# Patient Record
Sex: FEMALE | Race: WHITE | URBAN - METROPOLITAN AREA
[De-identification: names, ages, dates, MRNs, and addresses within clinical notes are randomized per-mention and may not be internally consistent; named-entity substitution may affect disease eponyms.]

---

## 2022-01-20 ENCOUNTER — IMMUNIZATION (OUTPATIENT)
Dept: LAB | Facility: HOSPITAL | Age: 22
End: 2022-01-20
Attending: EMERGENCY MEDICINE
Payer: MEDICAID

## 2022-01-20 DIAGNOSIS — Z23 NEED FOR VACCINATION: Primary | ICD-10-CM

## 2022-01-20 PROCEDURE — 0054A SARSCOV2 VAC 30MCG TRS SUCR: CPT

## 2022-08-16 ENCOUNTER — LAB ENCOUNTER (OUTPATIENT)
Dept: LAB | Age: 22
End: 2022-08-16
Attending: FAMILY MEDICINE
Payer: MEDICAID

## 2022-08-16 DIAGNOSIS — Z00.00 ROUTINE GENERAL MEDICAL EXAMINATION AT A HEALTH CARE FACILITY: Primary | ICD-10-CM

## 2022-08-16 DIAGNOSIS — Z11.1 SCREENING-PULMONARY TB: ICD-10-CM

## 2022-08-16 LAB
ALBUMIN SERPL-MCNC: 3.7 G/DL (ref 3.4–5)
ALBUMIN/GLOB SERPL: 1.1 {RATIO} (ref 1–2)
ALP LIVER SERPL-CCNC: 66 U/L
ALT SERPL-CCNC: 19 U/L
ANION GAP SERPL CALC-SCNC: 3 MMOL/L (ref 0–18)
AST SERPL-CCNC: 13 U/L (ref 15–37)
BASOPHILS # BLD AUTO: 0.02 X10(3) UL (ref 0–0.2)
BASOPHILS NFR BLD AUTO: 0.3 %
BILIRUB SERPL-MCNC: 0.3 MG/DL (ref 0.1–2)
BUN BLD-MCNC: 14 MG/DL (ref 7–18)
CALCIUM BLD-MCNC: 8.6 MG/DL (ref 8.5–10.1)
CHLORIDE SERPL-SCNC: 107 MMOL/L (ref 98–112)
CHOLEST SERPL-MCNC: 149 MG/DL (ref ?–200)
CO2 SERPL-SCNC: 27 MMOL/L (ref 21–32)
CREAT BLD-MCNC: 0.74 MG/DL
EOSINOPHIL # BLD AUTO: 0.14 X10(3) UL (ref 0–0.7)
EOSINOPHIL NFR BLD AUTO: 2.1 %
ERYTHROCYTE [DISTWIDTH] IN BLOOD BY AUTOMATED COUNT: 12.7 %
FASTING PATIENT LIPID ANSWER: YES
FASTING STATUS PATIENT QL REPORTED: YES
GFR SERPLBLD BASED ON 1.73 SQ M-ARVRAT: 118 ML/MIN/1.73M2 (ref 60–?)
GLOBULIN PLAS-MCNC: 3.4 G/DL (ref 2.8–4.4)
GLUCOSE BLD-MCNC: 109 MG/DL (ref 70–99)
HCT VFR BLD AUTO: 38.9 %
HDLC SERPL-MCNC: 54 MG/DL (ref 40–59)
HGB BLD-MCNC: 12.8 G/DL
IMM GRANULOCYTES # BLD AUTO: 0.02 X10(3) UL (ref 0–1)
IMM GRANULOCYTES NFR BLD: 0.3 %
LDLC SERPL CALC-MCNC: 86 MG/DL (ref ?–100)
LYMPHOCYTES # BLD AUTO: 2.4 X10(3) UL (ref 1–4)
LYMPHOCYTES NFR BLD AUTO: 35.5 %
MCH RBC QN AUTO: 29.4 PG (ref 26–34)
MCHC RBC AUTO-ENTMCNC: 32.9 G/DL (ref 31–37)
MCV RBC AUTO: 89.2 FL
MONOCYTES # BLD AUTO: 0.58 X10(3) UL (ref 0.1–1)
MONOCYTES NFR BLD AUTO: 8.6 %
NEUTROPHILS # BLD AUTO: 3.61 X10 (3) UL (ref 1.5–7.7)
NEUTROPHILS # BLD AUTO: 3.61 X10(3) UL (ref 1.5–7.7)
NEUTROPHILS NFR BLD AUTO: 53.2 %
NONHDLC SERPL-MCNC: 95 MG/DL (ref ?–130)
OSMOLALITY SERPL CALC.SUM OF ELEC: 285 MOSM/KG (ref 275–295)
PLATELET # BLD AUTO: 318 10(3)UL (ref 150–450)
POTASSIUM SERPL-SCNC: 4.1 MMOL/L (ref 3.5–5.1)
PROT SERPL-MCNC: 7.1 G/DL (ref 6.4–8.2)
RBC # BLD AUTO: 4.36 X10(6)UL
SODIUM SERPL-SCNC: 137 MMOL/L (ref 136–145)
TRIGL SERPL-MCNC: 37 MG/DL (ref 30–149)
TSI SER-ACNC: 1.91 MIU/ML (ref 0.36–3.74)
VLDLC SERPL CALC-MCNC: 6 MG/DL (ref 0–30)
WBC # BLD AUTO: 6.8 X10(3) UL (ref 4–11)

## 2022-08-16 PROCEDURE — 85025 COMPLETE CBC W/AUTO DIFF WBC: CPT

## 2022-08-16 PROCEDURE — 36415 COLL VENOUS BLD VENIPUNCTURE: CPT

## 2022-08-16 PROCEDURE — 86480 TB TEST CELL IMMUN MEASURE: CPT

## 2022-08-16 PROCEDURE — 80053 COMPREHEN METABOLIC PANEL: CPT

## 2022-08-16 PROCEDURE — 84443 ASSAY THYROID STIM HORMONE: CPT

## 2022-08-16 PROCEDURE — 80061 LIPID PANEL: CPT

## 2022-08-17 LAB
M TB IFN-G CD4+ T-CELLS BLD-ACNC: 0.07 IU/ML
M TB TUBERC IFN-G BLD QL: NEGATIVE
M TB TUBERC IGNF/MITOGEN IGNF CONTROL: >10 IU/ML
QFT TB1 AG MINUS NIL: -0.01 IU/ML
QFT TB2 AG MINUS NIL: -0.03 IU/ML

## 2023-01-24 ENCOUNTER — OFFICE VISIT (OUTPATIENT)
Dept: INTERNAL MEDICINE CLINIC | Facility: CLINIC | Age: 23
End: 2023-01-24

## 2023-01-24 VITALS
WEIGHT: 177 LBS | OXYGEN SATURATION: 98 % | DIASTOLIC BLOOD PRESSURE: 82 MMHG | HEIGHT: 63 IN | SYSTOLIC BLOOD PRESSURE: 118 MMHG | BODY MASS INDEX: 31.36 KG/M2 | HEART RATE: 87 BPM

## 2023-01-24 DIAGNOSIS — N94.6 DYSMENORRHEA: ICD-10-CM

## 2023-01-24 DIAGNOSIS — L70.8 OTHER ACNE: Primary | ICD-10-CM

## 2023-01-24 DIAGNOSIS — N28.1 KIDNEY CYST, ACQUIRED: ICD-10-CM

## 2023-01-24 DIAGNOSIS — R39.198 DIFFICULTY URINATING: ICD-10-CM

## 2023-01-24 PROCEDURE — 3074F SYST BP LT 130 MM HG: CPT | Performed by: INTERNAL MEDICINE

## 2023-01-24 PROCEDURE — 3079F DIAST BP 80-89 MM HG: CPT | Performed by: INTERNAL MEDICINE

## 2023-01-24 PROCEDURE — 99203 OFFICE O/P NEW LOW 30 MIN: CPT | Performed by: INTERNAL MEDICINE

## 2023-01-24 PROCEDURE — 3008F BODY MASS INDEX DOCD: CPT | Performed by: INTERNAL MEDICINE

## 2023-02-02 ENCOUNTER — PATIENT MESSAGE (OUTPATIENT)
Dept: INTERNAL MEDICINE CLINIC | Facility: CLINIC | Age: 23
End: 2023-02-02

## 2023-02-02 NOTE — TELEPHONE ENCOUNTER
From: Dimple Miranda  To: Soraida Hoffman MD  Sent: 2023 5:46 AM CST  Subject: Lab    Hi Dennise,   Wondering if the testosterone blood test that you have ordered for me includes progesterone and estrogen specifically. If not, can you please add those as well?    Thank you,   Sho Hayes

## 2023-02-08 ENCOUNTER — LAB ENCOUNTER (OUTPATIENT)
Dept: LAB | Age: 23
End: 2023-02-08
Attending: INTERNAL MEDICINE
Payer: MEDICAID

## 2023-02-08 ENCOUNTER — HOSPITAL ENCOUNTER (OUTPATIENT)
Dept: ULTRASOUND IMAGING | Age: 23
Discharge: HOME | End: 2023-02-08
Attending: INTERNAL MEDICINE
Payer: MEDICAID

## 2023-02-08 ENCOUNTER — PATIENT MESSAGE (OUTPATIENT)
Dept: INTERNAL MEDICINE CLINIC | Facility: CLINIC | Age: 23
End: 2023-02-08

## 2023-02-08 DIAGNOSIS — N94.6 DYSMENORRHEA: ICD-10-CM

## 2023-02-08 DIAGNOSIS — L70.8 OTHER ACNE: ICD-10-CM

## 2023-02-08 DIAGNOSIS — R39.198 DIFFICULTY URINATING: ICD-10-CM

## 2023-02-08 LAB
ALBUMIN SERPL-MCNC: 4.1 G/DL (ref 3.4–5)
ALBUMIN/GLOB SERPL: 1.1 {RATIO} (ref 1–2)
ALP LIVER SERPL-CCNC: 62 U/L
ALT SERPL-CCNC: 30 U/L
ANION GAP SERPL CALC-SCNC: 4 MMOL/L (ref 0–18)
AST SERPL-CCNC: 14 U/L (ref 15–37)
BASOPHILS # BLD AUTO: 0.01 X10(3) UL (ref 0–0.2)
BASOPHILS NFR BLD AUTO: 0.2 %
BILIRUB SERPL-MCNC: 0.5 MG/DL (ref 0.1–2)
BUN BLD-MCNC: 13 MG/DL (ref 7–18)
BUN/CREAT SERPL: 18.3 (ref 10–20)
CALCIUM BLD-MCNC: 9.1 MG/DL (ref 8.5–10.1)
CHLORIDE SERPL-SCNC: 104 MMOL/L (ref 98–112)
CO2 SERPL-SCNC: 30 MMOL/L (ref 21–32)
CREAT BLD-MCNC: 0.71 MG/DL
DEPRECATED RDW RBC AUTO: 40.8 FL (ref 35.1–46.3)
DHEA-S SERPL-MCNC: 421.1 UG/DL
EOSINOPHIL # BLD AUTO: 0.1 X10(3) UL (ref 0–0.7)
EOSINOPHIL NFR BLD AUTO: 1.5 %
ERYTHROCYTE [DISTWIDTH] IN BLOOD BY AUTOMATED COUNT: 12.4 % (ref 11–15)
ESTRADIOL SERPL-MCNC: 75.1 PG/ML
FASTING STATUS PATIENT QL REPORTED: YES
GFR SERPLBLD BASED ON 1.73 SQ M-ARVRAT: 123 ML/MIN/1.73M2 (ref 60–?)
GLOBULIN PLAS-MCNC: 3.6 G/DL (ref 2.8–4.4)
GLUCOSE BLD-MCNC: 101 MG/DL (ref 70–99)
HCT VFR BLD AUTO: 39.2 %
HGB BLD-MCNC: 13 G/DL
IMM GRANULOCYTES # BLD AUTO: 0.01 X10(3) UL (ref 0–1)
IMM GRANULOCYTES NFR BLD: 0.2 %
LYMPHOCYTES # BLD AUTO: 2.06 X10(3) UL (ref 1–4)
LYMPHOCYTES NFR BLD AUTO: 31.4 %
MCH RBC QN AUTO: 29.6 PG (ref 26–34)
MCHC RBC AUTO-ENTMCNC: 33.2 G/DL (ref 31–37)
MCV RBC AUTO: 89.3 FL
MONOCYTES # BLD AUTO: 0.6 X10(3) UL (ref 0.1–1)
MONOCYTES NFR BLD AUTO: 9.1 %
NEUTROPHILS # BLD AUTO: 3.78 X10 (3) UL (ref 1.5–7.7)
NEUTROPHILS # BLD AUTO: 3.78 X10(3) UL (ref 1.5–7.7)
NEUTROPHILS NFR BLD AUTO: 57.6 %
OSMOLALITY SERPL CALC.SUM OF ELEC: 286 MOSM/KG (ref 275–295)
PLATELET # BLD AUTO: 364 10(3)UL (ref 150–450)
POTASSIUM SERPL-SCNC: 3.8 MMOL/L (ref 3.5–5.1)
PROGEST SERPL-MCNC: 2.75 NG/ML
PROT SERPL-MCNC: 7.7 G/DL (ref 6.4–8.2)
RBC # BLD AUTO: 4.39 X10(6)UL
SODIUM SERPL-SCNC: 138 MMOL/L (ref 136–145)
WBC # BLD AUTO: 6.6 X10(3) UL (ref 4–11)

## 2023-02-08 PROCEDURE — 84144 ASSAY OF PROGESTERONE: CPT

## 2023-02-08 PROCEDURE — 36415 COLL VENOUS BLD VENIPUNCTURE: CPT

## 2023-02-08 PROCEDURE — 82627 DEHYDROEPIANDROSTERONE: CPT

## 2023-02-08 PROCEDURE — 85025 COMPLETE CBC W/AUTO DIFF WBC: CPT

## 2023-02-08 PROCEDURE — 84403 ASSAY OF TOTAL TESTOSTERONE: CPT

## 2023-02-08 PROCEDURE — 84402 ASSAY OF FREE TESTOSTERONE: CPT

## 2023-02-08 PROCEDURE — 76770 US EXAM ABDO BACK WALL COMP: CPT | Performed by: INTERNAL MEDICINE

## 2023-02-08 PROCEDURE — 80053 COMPREHEN METABOLIC PANEL: CPT

## 2023-02-08 PROCEDURE — 82670 ASSAY OF TOTAL ESTRADIOL: CPT

## 2023-02-09 NOTE — TELEPHONE ENCOUNTER
Dr. Nathalia Monroe, please review and advise on [employer] message. Thanks. No future appointments. US Kidney/bladder:    CONCLUSION:   1. Right kidney smaller than left, postop change? 2. Mild hydronephrosis right kidney. 3. Fullness to left collecting structures. 4. 204.66 ml postvoid urinary bladder volume. 11. Suggest obtaining old images/records for further evaluation.

## 2023-02-14 NOTE — TELEPHONE ENCOUNTER
Future Appointments   Date Time Provider Kj Hannon   3/31/2023  4:00 PM Vanda Carrillo MD [address]

## 2023-02-17 LAB
TESTOSTERONE, FREE, S: 0.54 NG/DL
TESTOSTERONE, TOTAL, S: 25 NG/DL

## 2023-03-20 ENCOUNTER — OFFICE VISIT (OUTPATIENT)
Dept: INTERNAL MEDICINE CLINIC | Facility: CLINIC | Age: 23
End: 2023-03-20

## 2023-03-20 VITALS
DIASTOLIC BLOOD PRESSURE: 81 MMHG | OXYGEN SATURATION: 97 % | HEIGHT: 63 IN | SYSTOLIC BLOOD PRESSURE: 123 MMHG | HEART RATE: 95 BPM | BODY MASS INDEX: 32.07 KG/M2 | WEIGHT: 181 LBS

## 2023-03-20 DIAGNOSIS — Z00.00 PHYSICAL EXAM, ANNUAL: Primary | ICD-10-CM

## 2023-03-20 DIAGNOSIS — Z30.011 BCP (BIRTH CONTROL PILLS) INITIATION: ICD-10-CM

## 2023-03-20 DIAGNOSIS — Z13.21 ENCOUNTER FOR VITAMIN DEFICIENCY SCREENING: ICD-10-CM

## 2023-03-20 PROCEDURE — 3079F DIAST BP 80-89 MM HG: CPT | Performed by: INTERNAL MEDICINE

## 2023-03-20 PROCEDURE — 99395 PREV VISIT EST AGE 18-39: CPT | Performed by: INTERNAL MEDICINE

## 2023-03-20 PROCEDURE — 3074F SYST BP LT 130 MM HG: CPT | Performed by: INTERNAL MEDICINE

## 2023-03-20 PROCEDURE — 3008F BODY MASS INDEX DOCD: CPT | Performed by: INTERNAL MEDICINE

## 2023-03-20 RX ORDER — NORGESTIMATE AND ETHINYL ESTRADIOL 7DAYSX3 28
1 KIT ORAL DAILY
Qty: 84 TABLET | Refills: 0 | Status: SHIPPED | OUTPATIENT
Start: 2023-03-20

## 2023-04-21 ENCOUNTER — LAB ENCOUNTER (OUTPATIENT)
Dept: LAB | Age: 23
End: 2023-04-21
Attending: INTERNAL MEDICINE
Payer: MEDICAID

## 2023-04-21 DIAGNOSIS — R73.9 HYPERGLYCEMIA: ICD-10-CM

## 2023-04-21 DIAGNOSIS — Z13.21 ENCOUNTER FOR VITAMIN DEFICIENCY SCREENING: ICD-10-CM

## 2023-04-21 DIAGNOSIS — Z00.00 PHYSICAL EXAM, ANNUAL: ICD-10-CM

## 2023-04-21 LAB
ALBUMIN SERPL-MCNC: 3.9 G/DL (ref 3.4–5)
ALBUMIN/GLOB SERPL: 1 {RATIO} (ref 1–2)
ALP LIVER SERPL-CCNC: 59 U/L
ALT SERPL-CCNC: 49 U/L
ANION GAP SERPL CALC-SCNC: 7 MMOL/L (ref 0–18)
AST SERPL-CCNC: 24 U/L (ref 15–37)
BASOPHILS # BLD AUTO: 0.02 X10(3) UL (ref 0–0.2)
BASOPHILS NFR BLD AUTO: 0.3 %
BILIRUB SERPL-MCNC: 0.4 MG/DL (ref 0.1–2)
BILIRUB UR QL: NEGATIVE
BUN BLD-MCNC: 12 MG/DL (ref 7–18)
BUN/CREAT SERPL: 16.4 (ref 10–20)
CALCIUM BLD-MCNC: 9.2 MG/DL (ref 8.5–10.1)
CHLORIDE SERPL-SCNC: 104 MMOL/L (ref 98–112)
CHOLEST SERPL-MCNC: 147 MG/DL (ref ?–200)
CLARITY UR: CLEAR
CO2 SERPL-SCNC: 28 MMOL/L (ref 21–32)
COLOR UR: COLORLESS
CREAT BLD-MCNC: 0.73 MG/DL
DEPRECATED RDW RBC AUTO: 41.8 FL (ref 35.1–46.3)
EOSINOPHIL # BLD AUTO: 0.12 X10(3) UL (ref 0–0.7)
EOSINOPHIL NFR BLD AUTO: 1.7 %
ERYTHROCYTE [DISTWIDTH] IN BLOOD BY AUTOMATED COUNT: 12.8 % (ref 11–15)
EST. AVERAGE GLUCOSE BLD GHB EST-MCNC: 108 MG/DL (ref 68–126)
FASTING PATIENT LIPID ANSWER: YES
FASTING STATUS PATIENT QL REPORTED: YES
GFR SERPLBLD BASED ON 1.73 SQ M-ARVRAT: 119 ML/MIN/1.73M2 (ref 60–?)
GLOBULIN PLAS-MCNC: 3.8 G/DL (ref 2.8–4.4)
GLUCOSE BLD-MCNC: 107 MG/DL (ref 70–99)
GLUCOSE UR-MCNC: NORMAL MG/DL
HBA1C MFR BLD: 5.4 % (ref ?–5.7)
HCT VFR BLD AUTO: 38.8 %
HDLC SERPL-MCNC: 70 MG/DL (ref 40–59)
HGB BLD-MCNC: 12.7 G/DL
HGB UR QL STRIP.AUTO: NEGATIVE
IMM GRANULOCYTES # BLD AUTO: 0.01 X10(3) UL (ref 0–1)
IMM GRANULOCYTES NFR BLD: 0.1 %
KETONES UR-MCNC: NEGATIVE MG/DL
LDLC SERPL CALC-MCNC: 63 MG/DL (ref ?–100)
LEUKOCYTE ESTERASE UR QL STRIP.AUTO: 500
LYMPHOCYTES # BLD AUTO: 1.99 X10(3) UL (ref 1–4)
LYMPHOCYTES NFR BLD AUTO: 28.1 %
MCH RBC QN AUTO: 29.2 PG (ref 26–34)
MCHC RBC AUTO-ENTMCNC: 32.7 G/DL (ref 31–37)
MCV RBC AUTO: 89.2 FL
MONOCYTES # BLD AUTO: 0.49 X10(3) UL (ref 0.1–1)
MONOCYTES NFR BLD AUTO: 6.9 %
NEUTROPHILS # BLD AUTO: 4.46 X10 (3) UL (ref 1.5–7.7)
NEUTROPHILS # BLD AUTO: 4.46 X10(3) UL (ref 1.5–7.7)
NEUTROPHILS NFR BLD AUTO: 62.9 %
NITRITE UR QL STRIP.AUTO: NEGATIVE
NONHDLC SERPL-MCNC: 77 MG/DL (ref ?–130)
OSMOLALITY SERPL CALC.SUM OF ELEC: 288 MOSM/KG (ref 275–295)
PH UR: 7 [PH] (ref 5–8)
PLATELET # BLD AUTO: 347 10(3)UL (ref 150–450)
POTASSIUM SERPL-SCNC: 4.3 MMOL/L (ref 3.5–5.1)
PROT SERPL-MCNC: 7.7 G/DL (ref 6.4–8.2)
PROT UR-MCNC: NEGATIVE MG/DL
RBC # BLD AUTO: 4.35 X10(6)UL
SODIUM SERPL-SCNC: 139 MMOL/L (ref 136–145)
SP GR UR STRIP: <1.005 (ref 1–1.03)
TRIGL SERPL-MCNC: 72 MG/DL (ref 30–149)
TSI SER-ACNC: 1.12 MIU/ML (ref 0.36–3.74)
UROBILINOGEN UR STRIP-ACNC: NORMAL
VIT D+METAB SERPL-MCNC: 28.8 NG/ML (ref 30–100)
VLDLC SERPL CALC-MCNC: 11 MG/DL (ref 0–30)
WBC # BLD AUTO: 7.1 X10(3) UL (ref 4–11)

## 2023-04-21 PROCEDURE — 80053 COMPREHEN METABOLIC PANEL: CPT

## 2023-04-21 PROCEDURE — 85025 COMPLETE CBC W/AUTO DIFF WBC: CPT

## 2023-04-21 PROCEDURE — 81001 URINALYSIS AUTO W/SCOPE: CPT

## 2023-04-21 PROCEDURE — 82306 VITAMIN D 25 HYDROXY: CPT

## 2023-04-21 PROCEDURE — 84443 ASSAY THYROID STIM HORMONE: CPT

## 2023-04-21 PROCEDURE — 36415 COLL VENOUS BLD VENIPUNCTURE: CPT

## 2023-04-21 PROCEDURE — 83036 HEMOGLOBIN GLYCOSYLATED A1C: CPT

## 2023-04-21 PROCEDURE — 80061 LIPID PANEL: CPT

## 2023-04-28 ENCOUNTER — LAB ENCOUNTER (OUTPATIENT)
Dept: LAB | Age: 23
End: 2023-04-28
Attending: INTERNAL MEDICINE
Payer: MEDICAID

## 2023-04-28 DIAGNOSIS — R82.90 ABNORMAL RESULT ON SCREENING URINE TEST: ICD-10-CM

## 2023-04-28 LAB
BILIRUB UR QL: NEGATIVE
COLOR UR: YELLOW
GLUCOSE UR-MCNC: NORMAL MG/DL
KETONES UR-MCNC: NEGATIVE MG/DL
LEUKOCYTE ESTERASE UR QL STRIP.AUTO: 500
PH UR: 6 [PH] (ref 5–8)
PROT UR-MCNC: 20 MG/DL
SP GR UR STRIP: 1.03 (ref 1–1.03)
UROBILINOGEN UR STRIP-ACNC: NORMAL
WBC #/AREA URNS AUTO: >50 /HPF

## 2023-04-28 PROCEDURE — 87186 SC STD MICRODIL/AGAR DIL: CPT

## 2023-04-28 PROCEDURE — 87086 URINE CULTURE/COLONY COUNT: CPT

## 2023-04-28 PROCEDURE — 81001 URINALYSIS AUTO W/SCOPE: CPT

## 2023-04-28 PROCEDURE — 87088 URINE BACTERIA CULTURE: CPT

## 2023-05-01 ENCOUNTER — TELEPHONE (OUTPATIENT)
Dept: INTERNAL MEDICINE CLINIC | Facility: CLINIC | Age: 23
End: 2023-05-01

## 2023-05-01 RX ORDER — NITROFURANTOIN 25; 75 MG/1; MG/1
100 CAPSULE ORAL 2 TIMES DAILY
Qty: 14 CAPSULE | Refills: 0 | Status: SHIPPED | OUTPATIENT
Start: 2023-05-01

## 2023-05-01 RX ORDER — NITROFURANTOIN 25; 75 MG/1; MG/1
100 CAPSULE ORAL 2 TIMES DAILY
Qty: 14 CAPSULE | Refills: 0 | Status: CANCELLED | OUTPATIENT
Start: 2023-05-01

## 2023-05-01 NOTE — TELEPHONE ENCOUNTER
From: Lily Miranda  Sent: 5/1/2023 9:36 AM CDT  To: Em Triage Support  Subject: Medication hi hi Yesenia Restrepo    Is it possible to make the change, or [facility] would be the best?

## 2023-05-01 NOTE — TELEPHONE ENCOUNTER
Please resend to [facility]?

## 2023-05-06 ENCOUNTER — TELEPHONE (OUTPATIENT)
Dept: INTERNAL MEDICINE CLINIC | Facility: CLINIC | Age: 23
End: 2023-05-06

## 2023-05-08 NOTE — TELEPHONE ENCOUNTER
See 5/6/23 Patient message Antibiotics. No further action needed. From: Lisandra Miranda  Sent: 5/6/2023  1:26 PM CDT  To: Em Triage Support  Subject: medication refill    Hi,   I am having bad service today, since I am out of town. But is there any way to get to your office this Monday? I have an appointment scheduled for next Monday, but is there a way to come in sooner?   Alesia Lafleur

## 2023-05-15 ENCOUNTER — OFFICE VISIT (OUTPATIENT)
Dept: INTERNAL MEDICINE CLINIC | Facility: CLINIC | Age: 23
End: 2023-05-15

## 2023-05-15 ENCOUNTER — TELEPHONE (OUTPATIENT)
Dept: INTERNAL MEDICINE CLINIC | Facility: CLINIC | Age: 23
End: 2023-05-15

## 2023-05-15 VITALS
WEIGHT: 177.81 LBS | HEART RATE: 75 BPM | BODY MASS INDEX: 31.5 KG/M2 | HEIGHT: 63 IN | DIASTOLIC BLOOD PRESSURE: 80 MMHG | SYSTOLIC BLOOD PRESSURE: 119 MMHG

## 2023-05-15 DIAGNOSIS — R31.29 MICROSCOPIC HEMATURIA: ICD-10-CM

## 2023-05-15 DIAGNOSIS — Z98.890 HISTORY OF UROLOGIC SURGERY: ICD-10-CM

## 2023-05-15 DIAGNOSIS — N39.0 URINARY TRACT INFECTION WITHOUT HEMATURIA, SITE UNSPECIFIED: Primary | ICD-10-CM

## 2023-05-15 DIAGNOSIS — N13.39 OTHER HYDRONEPHROSIS: ICD-10-CM

## 2023-05-15 PROBLEM — Q64.9: Status: ACTIVE | Noted: 2023-05-15

## 2023-05-15 PROBLEM — N13.1 HYDRONEPHROSIS WITH URETERAL STRICTURE, NOT ELSEWHERE CLASSIFIED: Status: ACTIVE | Noted: 2023-05-15

## 2023-05-15 PROCEDURE — 99214 OFFICE O/P EST MOD 30 MIN: CPT | Performed by: INTERNAL MEDICINE

## 2023-05-15 PROCEDURE — 3074F SYST BP LT 130 MM HG: CPT | Performed by: INTERNAL MEDICINE

## 2023-05-15 PROCEDURE — 3008F BODY MASS INDEX DOCD: CPT | Performed by: INTERNAL MEDICINE

## 2023-05-15 PROCEDURE — 3079F DIAST BP 80-89 MM HG: CPT | Performed by: INTERNAL MEDICINE

## 2023-05-15 NOTE — TELEPHONE ENCOUNTER
Can you see this pt  Sooner than 6-7-23. reccurent  RIGHT  Flank Pain  And symptoms of  UTI, history of surgery  Is infant in [place].

## 2023-05-15 NOTE — TELEPHONE ENCOUNTER
I will have my staff try to get her in sooner. Instead of getting a repeat ultrasound, can you please get a CT urogram?  For insurance purposes, diagnosis can be microscopic hematuria and recurrent UTIs. Uro staff: Please see if we can accommodate this patient for consultation earlier than 6/7/2023. Preferably after updated imaging (CT urogram) which Dr. Kaushik Oviedo would order.        Nataliya Vasques MD  5/15/2023

## 2023-05-16 NOTE — TELEPHONE ENCOUNTER
Patient has scheduled CT urogram scheduled for 5/25/23. Please advise if I can schedule patient in a procedure spot.      Future Appointments   Date Time Provider Kj Hannon   5/25/2023  7:00 PM [address] [facility] CT MAIN RM4 [address] [place] [address]   6/7/2023  4:00 PM [facility]   6/16/2023 10:00 AM Alina Masterson MD [facility] [place]

## 2023-05-17 ENCOUNTER — LAB ENCOUNTER (OUTPATIENT)
Dept: LAB | Age: 23
End: 2023-05-17
Attending: INTERNAL MEDICINE
Payer: MEDICAID

## 2023-05-17 DIAGNOSIS — N39.0 URINARY TRACT INFECTION WITHOUT HEMATURIA, SITE UNSPECIFIED: ICD-10-CM

## 2023-05-17 LAB
BILIRUB UR QL: NEGATIVE
CLARITY UR: CLEAR
COLOR UR: COLORLESS
GLUCOSE UR-MCNC: NORMAL MG/DL
HGB UR QL STRIP.AUTO: NEGATIVE
KETONES UR-MCNC: NEGATIVE MG/DL
LEUKOCYTE ESTERASE UR QL STRIP.AUTO: 25
NITRITE UR QL STRIP.AUTO: NEGATIVE
PH UR: 6.5 [PH] (ref 5–8)
PROT UR-MCNC: NEGATIVE MG/DL
SP GR UR STRIP: 1.01 (ref 1–1.03)
UROBILINOGEN UR STRIP-ACNC: NORMAL

## 2023-05-17 PROCEDURE — 87086 URINE CULTURE/COLONY COUNT: CPT

## 2023-05-17 PROCEDURE — 81001 URINALYSIS AUTO W/SCOPE: CPT

## 2023-05-17 NOTE — TELEPHONE ENCOUNTER
Spoke with patient, assisted in scheduling consult. PT confirmed and verbalized understanding. Javier Alcala MD  You; Em Urology Clinical Staff 2 hours ago (9:17 AM)     Can double book 2 PM 5/31 or use procedure slot 6/1.       Future Appointments   Date Time Provider Kj Riddhi   5/25/2023  7:00 PM [address] [facility] [address] [place] [address]   6/1/2023  1:00 PM Javier Alcala MD [facility]   6/7/2023  4:00 PM Javier Alcala MD [facility]   6/16/2023 10:00 AM Osorio Luevano MD [facility] [place]

## 2023-05-25 ENCOUNTER — HOSPITAL ENCOUNTER (OUTPATIENT)
Dept: CT IMAGING | Facility: HOSPITAL | Age: 23
Discharge: HOME | End: 2023-05-25
Attending: INTERNAL MEDICINE
Payer: MEDICAID

## 2023-05-25 DIAGNOSIS — Z98.890 HISTORY OF UROLOGIC SURGERY: ICD-10-CM

## 2023-05-25 DIAGNOSIS — R31.29 MICROSCOPIC HEMATURIA: ICD-10-CM

## 2023-05-25 DIAGNOSIS — N13.39 OTHER HYDRONEPHROSIS: ICD-10-CM

## 2023-05-25 LAB
CREAT BLD-MCNC: 0.7 MG/DL
GFR SERPLBLD BASED ON 1.73 SQ M-ARVRAT: 125 ML/MIN/1.73M2 (ref 60–?)

## 2023-05-25 PROCEDURE — 82565 ASSAY OF CREATININE: CPT

## 2023-05-25 PROCEDURE — 74178 CT ABD&PLV WO CNTR FLWD CNTR: CPT | Performed by: INTERNAL MEDICINE

## 2023-05-25 PROCEDURE — 76377 3D RENDER W/INTRP POSTPROCES: CPT | Performed by: INTERNAL MEDICINE

## 2023-05-26 ENCOUNTER — TELEPHONE (OUTPATIENT)
Dept: INTERNAL MEDICINE CLINIC | Facility: CLINIC | Age: 23
End: 2023-05-26

## 2023-05-26 NOTE — TELEPHONE ENCOUNTER
Message # [phone]         2023 08:09p   [CRISTIAN]  To:  From:  RL Saini MD:  Phone#:  ----------------------------------------------------------------------  FANTASMA Kam [phone] RE ROSI WEISS,  2000  STAT RESULTS  Paged at number :  PAGE: [phone] at : [facility]-  20:09

## 2023-05-26 NOTE — TELEPHONE ENCOUNTER
Received page 5/25, unable to reach patient,   Spoke with patient this morning 5/26; informed of results, needs pelvic MRI w/wo contrast. She is also scheduled for further evaluation with Urology 6/01/2023. Strongly advised to keep appt. F/u with PCP if any questions.

## 2023-05-30 ENCOUNTER — HOSPITAL ENCOUNTER (OUTPATIENT)
Dept: MRI IMAGING | Facility: HOSPITAL | Age: 23
Discharge: HOME | End: 2023-05-30
Attending: NURSE PRACTITIONER
Payer: MEDICAID

## 2023-05-30 DIAGNOSIS — R93.5 ABNORMAL CT OF THE ABDOMEN: ICD-10-CM

## 2023-05-30 PROCEDURE — 72197 MRI PELVIS W/O & W/DYE: CPT | Performed by: NURSE PRACTITIONER

## 2023-05-30 PROCEDURE — A9575 INJ GADOTERATE MEGLUMI 0.1ML: HCPCS | Performed by: NURSE PRACTITIONER

## 2023-05-30 RX ORDER — GADOTERATE MEGLUMINE 376.9 MG/ML
20 INJECTION INTRAVENOUS
Status: COMPLETED | OUTPATIENT
Start: 2023-05-30 | End: 2023-05-30

## 2023-05-30 RX ADMIN — GADOTERATE MEGLUMINE 15 ML: 376.9 INJECTION INTRAVENOUS at 11:36:00

## 2023-06-01 ENCOUNTER — OFFICE VISIT (OUTPATIENT)
Dept: SURGERY | Facility: CLINIC | Age: 23
End: 2023-06-01

## 2023-06-01 VITALS — HEART RATE: 92 BPM | SYSTOLIC BLOOD PRESSURE: 127 MMHG | DIASTOLIC BLOOD PRESSURE: 88 MMHG

## 2023-06-01 DIAGNOSIS — Q62.5 DUPLICATED URINARY COLLECTING SYSTEM: ICD-10-CM

## 2023-06-01 DIAGNOSIS — Z98.890 S/P UROLOGICAL SURGERY: ICD-10-CM

## 2023-06-01 DIAGNOSIS — R82.71 ASYMPTOMATIC BACTERIURIA: Primary | ICD-10-CM

## 2023-06-01 PROCEDURE — 99244 OFF/OP CNSLTJ NEW/EST MOD 40: CPT | Performed by: UROLOGY

## 2023-06-01 PROCEDURE — 3074F SYST BP LT 130 MM HG: CPT | Performed by: UROLOGY

## 2023-06-01 PROCEDURE — 3079F DIAST BP 80-89 MM HG: CPT | Performed by: UROLOGY

## 2023-06-01 NOTE — PROGRESS NOTES
[facility] Urology  Initial Office Consultation    HPI:   Juan Iglesias is a 25year old female here today for consultation at the request of, and a copy of this note will be sent to, Sarthak Marvin MD.  Accompanied by her mother. 1. Asymptomatic Bacteriuria  2. Bilateral Duplicated collecting system  3. History of pediatric urological surgery  History obtained from patient and accompanying mother's recollection. Alesia Lafleur was born in [facility] and moved here around age 15. 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 Looks like she had recurrent UTIs and pyelonephritis as a child. Sounds like she underwent a right upper pole moiety partial nephrectomy with partial ureterectomy at age 3 as evidenced by a right flank scar. At almost 3years old she had bladder surgery to questionably perform a distal right ureterectomy versus reimplant for management of a ureterocele. She denies any flank pain at this point. No gross hematuria or dysuria. No difficulty urinating or incomplete bladder emptying. No straining to urinate. Her primary care physician sent her urine for routine analysis on 4/21/2023. This revealed pyuria, and bacteriuria. Repeat urinalysis 4/28/2023 with leuks, nitrites, pyuria, microhematuria. Urine culture was positive for E. Coli. Patient was completely asymptomatic. Treated with a course of sensitive antibiotics. Repeat urine culture was negative. She had a renal ultrasound February 2023 which revealed right kidney smaller than the left. Mild right hydronephrosis. Fullness of the left collecting structures. 200 for a mild postvoid bladder volume. CT urogram completed 5/2023 revealing irregular soft tissue at the base of the urinary bladder.   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 Partial duplication of the left collecting system with hydroureter of the mid to distal left ureter. No evidence of obstruction. No urinary calculi or obstructive uropathy. This was followed by an MRI of the pelvis with and without contrast 5/30/2023 which revealed a fluid-filled tubular structure of the right hemipelvis favored to represent a duplicated ureteral segment, supported by presence of a small bladder ureterocele. The superior margin of this process appears blind-ending which may be on a chronic postsurgical basis. No focal wall thickening or mass of the bladder. No adenopathy. Patient has normal kidney function at baseline. Bladder scan today reveals a PVR of 3 mL. 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 PAST SURGICAL HISTORY: ? Right upper pole moiety heminephrectomy with partial ureterectomy. ? Distal ureterectomy. SOCIAL HISTORY: Single. No children. No smoking or illicit drug use. In school to become a radiology technician. History reviewed. No pertinent family history. Allergies: Patient has no known allergies. REVIEW OF SYSTEMS:  Pertinent positives and negatives per HPI. A 12-point ROS was performed and is otherwise negative. EXAM:  /88 (BP Location: Right arm, Patient Position: Sitting, Cuff Size: adult)   Pulse 92   LMP 05/03/2023 (Exact Date)     Physical Exam  Vitals reviewed. Constitutional:       General: She is not in acute distress. Appearance: She is well-developed. HENT:      Head: Atraumatic. Eyes:      General: No scleral icterus. Cardiovascular:      Rate and Rhythm: Normal rate. Pulmonary:      Effort: Pulmonary effort is normal.   Abdominal:      Palpations: Abdomen is soft. There is no mass. Tenderness:  There is no right CVA tenderness or left CVA tenderness. Comments: Right flank scar. Suprapubic pfannenstiel scar. Musculoskeletal:         General: Normal range of motion. Cervical back: Normal range of motion. Skin:     General: Skin is warm and dry. Neurological:      Mental Status: She is alert and oriented to person, place, and time. Psychiatric:         Behavior: Behavior normal.       LABS:  See HPI for details. IMAGING:    MRI PELVIS W+WO (5/30/2023): 1. Fluid-filled tubular structure of the right hemipelvis favored to represent a duplicated ureteral segment, supported by presence of a small bladder ureterocele. The superior margin of this process appears blind-ending which may be on a chronic postsurgical basis. 2. Thickening of the junctional zone within the uterine fundus may be a manifestation of adenomyosis, though no associated subendometrial cystic change. Clinical correlation advised. 3. Normal ovaries. CT UROGRAM (5/25/2023):  1. There is irregular soft tissue at the base the urinary bladder measuring up to 2.7 x 1.1 cm that could represent a primary urothelial neoplasm in the appropriate clinical setting. Clinical correlation recommended along with cystoscopic evaluation as clinically directed. 2. There is a nonspecific dilated tubular structure in the right pelvis along the right adnexa measuring up to 11 mm in diameter that does not fill with contrast on the delayed phase imaging that could represent an anomalous partially duplicated right ureter as is suggested by the patient's previous urologic surgical history as documented in the patient's chart, less likely an area of hydrosalpinx, an atypical ureterocele, with other etiologies not entirely excluded. Clinical correlation recommended. Pelvic MRI with and without contrast may be helpful for further characterization.   3. There is partial duplication of the left collecting system with hydroureter of the mid to distal left ureter measuring up to 10 mm in diameter which may be congenital given the patient's history. Clinical correlation recommended. There is no evidence of obstruction. 4. No urinary calculi or obstructive uropathy. Please see above for further details. The radiology support staff is in the process of contacting the referring physician regarding this report. IMPRESSION:  25year old female with a complicated past pediatric urologic history most likely consistent with bilateral duplicated collecting systems and a nonfunctioning obstructed right upper pole moiety which was managed surgically in [facility] as a child with right upper pole heminephrectomy. She also seems to have underwent surgery for management of a right-sided ureterocele. Patient with what looks like a defunctionalized segment of the right ureter distally. She has no clear evidence of urinary obstruction. Pelvic MRI without concerns for bladder masses. She currently has normal kidney function on blood work. She has had asymptomatic bacteriuria but denied any active symptoms to suggest a UTI. Findings reviewed with patient and accompanying mother at length. Extensive chart review performed. I do not feel that there is any indication for further urologic intervention at this time. Her congenital urological anomalies seem to have been managed surgically as a child. Current imaging findings in my opinion do not warrant further intervention. Regarding her asymptomatic bacteriuria: I discussed that there is no indication for treatment unless she becomes pregnant at which point asymptomatic bacteriuria should be treated. Potentially she can be placed on suppressive antibiotics if this was persistent at the time. I think we may follow her up with annual renal ultrasounds to ensure absence of hydronephrosis or renal obstruction. We may consider cystoscopy with bilateral retrograde pyelography in the future for further anatomic evaluation. We may also consider nuclear medicine renal scan and VCUG for further evaluation as clinically indicated. Patient and mother verbalized understanding. They agree with the plan of care. All questions answered. PLAN:  1. No urgent urologic intervention at this point. Clinically monitor. 2.  Asymptomatic bacteriuria does not need to be treated unless patient becomes pregnant. This will be screened for accordingly. 3.  Follow-up with annual renal ultrasounds for the time being. 4.  Further anatomic evaluation as indicated, to include cystoscopy with bilateral retrograde pyelograms, voiding cystourethrogram, and nuclear medicine renal scan.       Byron Caballero MD  6/1/2023

## 2023-06-14 RX ORDER — NORGESTIMATE AND ETHINYL ESTRADIOL 7DAYSX3 28
1 KIT ORAL DAILY
Qty: 84 TABLET | Refills: 0 | Status: SHIPPED | OUTPATIENT
Start: 2023-06-14

## 2023-06-14 NOTE — TELEPHONE ENCOUNTER
No pap result seen on file. Thanks     Please review refill protocol failed/ no protocol  Requested Prescriptions   Pending Prescriptions Disp Refills    Norgestim-Eth Estrad Triphasic (ORTHO TRI-CYCLEN, 28,) 0.18/0.215/0.25 MG-35 MCG Oral Tab 84 tablet 0     Sig: Take 1 tablet by mouth daily.        Gynecology Medication Protocol Failed - 6/14/2023 10:02 AM        Failed - Pass dependent on manual look-up of last PAP and patient compliance with PAP follow up recommendations        Passed - In person appointment or virtual visit in the past 12 mos or appointment in next 3 mos     Recent Outpatient Visits              1 week ago Asymptomatic bacteriuria    Yony Phillips MD    Office Visit    1 month ago [facility]inary tract infection without hematuria, site [facility]    [facility]    2 months ago Physical exam, annual    Darla Miss, [address], [place] Brady Ott MD    Office Visit    4 months ago Other acne    Vicki Marinelli, Olaf Castellanos MD    Office Visit          Future Appointments         Provider Department Appt Notes    In 2 days Brady Ott MD Darla Miss, [address], [place] Follow up on medication    In 1 week [address] [facility]  Sw up on the right kidney
5

## 2023-06-16 ENCOUNTER — OFFICE VISIT (OUTPATIENT)
Dept: INTERNAL MEDICINE CLINIC | Facility: CLINIC | Age: 23
End: 2023-06-16

## 2023-06-16 VITALS
WEIGHT: 171.31 LBS | DIASTOLIC BLOOD PRESSURE: 75 MMHG | HEIGHT: 63 IN | SYSTOLIC BLOOD PRESSURE: 111 MMHG | BODY MASS INDEX: 30.35 KG/M2 | RESPIRATION RATE: 16 BRPM | HEART RATE: 69 BPM

## 2023-06-16 DIAGNOSIS — Q64.9 CONGENITAL ANOMALY OF URINARY TRACT: ICD-10-CM

## 2023-06-16 DIAGNOSIS — N28.1 KIDNEY CYST, ACQUIRED: ICD-10-CM

## 2023-06-16 DIAGNOSIS — F41.8 ANXIETY ABOUT HEALTH: ICD-10-CM

## 2023-06-16 DIAGNOSIS — Z30.41 SURVEILLANCE FOR BIRTH CONTROL, ORAL CONTRACEPTIVES: Primary | ICD-10-CM

## 2023-06-16 PROCEDURE — 3074F SYST BP LT 130 MM HG: CPT | Performed by: INTERNAL MEDICINE

## 2023-06-16 PROCEDURE — 3078F DIAST BP <80 MM HG: CPT | Performed by: INTERNAL MEDICINE

## 2023-06-16 PROCEDURE — 99213 OFFICE O/P EST LOW 20 MIN: CPT | Performed by: INTERNAL MEDICINE

## 2023-06-16 PROCEDURE — 3008F BODY MASS INDEX DOCD: CPT | Performed by: INTERNAL MEDICINE

## 2023-06-22 ENCOUNTER — HOSPITAL ENCOUNTER (OUTPATIENT)
Dept: ULTRASOUND IMAGING | Age: 23
Discharge: HOME | End: 2023-06-22
Attending: INTERNAL MEDICINE
Payer: MEDICAID

## 2023-06-22 DIAGNOSIS — N13.39 OTHER HYDRONEPHROSIS: ICD-10-CM

## 2023-06-22 DIAGNOSIS — N39.0 URINARY TRACT INFECTION WITHOUT HEMATURIA, SITE UNSPECIFIED: ICD-10-CM

## 2023-06-22 PROCEDURE — 76770 US EXAM ABDO BACK WALL COMP: CPT | Performed by: INTERNAL MEDICINE

## 2023-06-27 ENCOUNTER — PATIENT MESSAGE (OUTPATIENT)
Dept: INTERNAL MEDICINE CLINIC | Facility: CLINIC | Age: 23
End: 2023-06-27

## 2023-07-21 ENCOUNTER — LAB ENCOUNTER (OUTPATIENT)
Dept: LAB | Age: 23
End: 2023-07-21
Attending: INTERNAL MEDICINE
Payer: MEDICAID

## 2023-07-21 DIAGNOSIS — N28.1 KIDNEY CYST, ACQUIRED: ICD-10-CM

## 2023-07-21 DIAGNOSIS — Q64.9 CONGENITAL ANOMALY OF URINARY TRACT: ICD-10-CM

## 2023-07-21 LAB
ALBUMIN SERPL-MCNC: 3.4 G/DL (ref 3.4–5)
ALBUMIN/GLOB SERPL: 1 {RATIO} (ref 1–2)
ALP LIVER SERPL-CCNC: 56 U/L
ALT SERPL-CCNC: 40 U/L
ANION GAP SERPL CALC-SCNC: 4 MMOL/L (ref 0–18)
AST SERPL-CCNC: 19 U/L (ref 15–37)
BASOPHILS # BLD AUTO: 0.02 X10(3) UL (ref 0–0.2)
BASOPHILS NFR BLD AUTO: 0.2 %
BILIRUB SERPL-MCNC: 0.4 MG/DL (ref 0.1–2)
BUN BLD-MCNC: 14 MG/DL (ref 7–18)
BUN/CREAT SERPL: 20.6 (ref 10–20)
CALCIUM BLD-MCNC: 9 MG/DL (ref 8.5–10.1)
CHLORIDE SERPL-SCNC: 107 MMOL/L (ref 98–112)
CO2 SERPL-SCNC: 27 MMOL/L (ref 21–32)
CREAT BLD-MCNC: 0.68 MG/DL
DEPRECATED RDW RBC AUTO: 43.4 FL (ref 35.1–46.3)
EGFRCR SERPLBLD CKD-EPI 2021: 126 ML/MIN/1.73M2 (ref 60–?)
EOSINOPHIL # BLD AUTO: 0.04 X10(3) UL (ref 0–0.7)
EOSINOPHIL NFR BLD AUTO: 0.5 %
ERYTHROCYTE [DISTWIDTH] IN BLOOD BY AUTOMATED COUNT: 13.1 % (ref 11–15)
FASTING STATUS PATIENT QL REPORTED: YES
GLOBULIN PLAS-MCNC: 3.5 G/DL (ref 2.8–4.4)
GLUCOSE BLD-MCNC: 91 MG/DL (ref 70–99)
HCT VFR BLD AUTO: 38.9 %
HGB BLD-MCNC: 12.8 G/DL
IMM GRANULOCYTES # BLD AUTO: 0.04 X10(3) UL (ref 0–1)
IMM GRANULOCYTES NFR BLD: 0.5 %
LYMPHOCYTES # BLD AUTO: 2.36 X10(3) UL (ref 1–4)
LYMPHOCYTES NFR BLD AUTO: 28.8 %
MCH RBC QN AUTO: 29.6 PG (ref 26–34)
MCHC RBC AUTO-ENTMCNC: 32.9 G/DL (ref 31–37)
MCV RBC AUTO: 89.8 FL
MONOCYTES # BLD AUTO: 0.74 X10(3) UL (ref 0.1–1)
MONOCYTES NFR BLD AUTO: 9 %
NEUTROPHILS # BLD AUTO: 5 X10 (3) UL (ref 1.5–7.7)
NEUTROPHILS # BLD AUTO: 5 X10(3) UL (ref 1.5–7.7)
NEUTROPHILS NFR BLD AUTO: 61 %
OSMOLALITY SERPL CALC.SUM OF ELEC: 286 MOSM/KG (ref 275–295)
PLATELET # BLD AUTO: 323 10(3)UL (ref 150–450)
POTASSIUM SERPL-SCNC: 4.1 MMOL/L (ref 3.5–5.1)
PROT SERPL-MCNC: 6.9 G/DL (ref 6.4–8.2)
RBC # BLD AUTO: 4.33 X10(6)UL
SODIUM SERPL-SCNC: 138 MMOL/L (ref 136–145)
WBC # BLD AUTO: 8.2 X10(3) UL (ref 4–11)

## 2023-07-21 PROCEDURE — 80053 COMPREHEN METABOLIC PANEL: CPT

## 2023-07-21 PROCEDURE — 36415 COLL VENOUS BLD VENIPUNCTURE: CPT

## 2023-07-21 PROCEDURE — 85025 COMPLETE CBC W/AUTO DIFF WBC: CPT

## 2023-08-21 RX ORDER — NORGESTIMATE AND ETHINYL ESTRADIOL 7DAYSX3 28
1 KIT ORAL DAILY
Qty: 84 TABLET | Refills: 1 | Status: SHIPPED | OUTPATIENT
Start: 2023-08-21

## 2023-08-21 NOTE — TELEPHONE ENCOUNTER
Dr. Korey Gilbert - at last office visit, 6/6/23, note stated:   Assessment & Plan:   Surveillance for birth control, oral contraceptives  (primary encounter diagnosis) continue Ortho Tri-Cyclen, referred patient to see gynecology    But patient has not seen gynecologist yet. Okay to refill until she sees  gynecology ? RN sent Deephart , advising her to schedule with gynecologist      Please Review. Protocol Failed or has no protocol. Requested Prescriptions   Pending Prescriptions Disp Refills    Norgestim-Eth Estrad Triphasic (ORTHO TRI-CYCLEN, 28,) 0.18/0.215/0.25 MG-35 MCG Oral Tab 84 tablet 0     Sig: Take 1 tablet by mouth daily.        Gynecology Medication Protocol Failed - 8/21/2023  6:40 AM        Failed - Pass dependent on manual look-up of last PAP and patient compliance with PAP follow up recommendations        Passed - In person appointment or virtual visit in the past 12 mos or appointment in next 3 mos     Recent Outpatient Visits              2 months ago Surveillance for birth control, oral contraceptives    [address],[facility], [facility], Carmina Fritz MD    Office Visit    2 months ago Asymptomatic bacteriuria    Ho Davey MD    Office Visit    3 months ago Urinary tract infection without hematuria, site unspecified    [address], [place], Carmina Fritz MD    Office Visit    5 months ago Physical exam, annual    [address], [facility], Carmina Fritz MD    Office Visit    6 months ago Other [facility], Carmina Fritz MD    Office Visit                         Recent Outpatient Visits              2 months ago Surveillance for birth control, oral contraceptives    [address], [facility], Karen Eddy MD    Office Visit    2 months ago Asymptomatic bacteriuria    Jocelyne David Maldonado MD    Office Visit    3 months ago Urinary tract infection without hematuria, site unspecified    Patricio Freeman, [address], [place] Janene Gaul, MD    Office Visit    5 months ago Physical exam, annual    Patricio Freeman, [address], [place] Janene Gaul, MD    Office Visit    6 months ago Other acne    Melia Ortega, Karen Malik MD    Office Visit

## 2023-09-27 ENCOUNTER — OFFICE VISIT (OUTPATIENT)
Facility: CLINIC | Age: 23
End: 2023-09-27
Payer: MEDICAID

## 2023-09-27 ENCOUNTER — PATIENT MESSAGE (OUTPATIENT)
Dept: SURGERY | Facility: CLINIC | Age: 23
End: 2023-09-27

## 2023-09-27 VITALS
DIASTOLIC BLOOD PRESSURE: 80 MMHG | HEART RATE: 83 BPM | HEIGHT: 63 IN | BODY MASS INDEX: 29.77 KG/M2 | SYSTOLIC BLOOD PRESSURE: 116 MMHG | WEIGHT: 168 LBS

## 2023-09-27 DIAGNOSIS — Z12.4 CERVICAL CANCER SCREENING: ICD-10-CM

## 2023-09-27 DIAGNOSIS — Z01.419 ENCOUNTER FOR WELL WOMAN EXAM WITH ROUTINE GYNECOLOGICAL EXAM: Primary | ICD-10-CM

## 2023-09-27 DIAGNOSIS — Z30.41 USES ORAL CONTRACEPTION: ICD-10-CM

## 2023-09-27 PROCEDURE — 99385 PREV VISIT NEW AGE 18-39: CPT

## 2023-09-27 PROCEDURE — 88175 CYTOPATH C/V AUTO FLUID REDO: CPT

## 2023-09-27 PROCEDURE — 3008F BODY MASS INDEX DOCD: CPT

## 2023-09-27 PROCEDURE — 87591 N.GONORRHOEAE DNA AMP PROB: CPT

## 2023-09-27 PROCEDURE — 87491 CHLMYD TRACH DNA AMP PROBE: CPT

## 2023-09-27 PROCEDURE — 3079F DIAST BP 80-89 MM HG: CPT

## 2023-09-27 PROCEDURE — 3074F SYST BP LT 130 MM HG: CPT

## 2023-09-27 RX ORDER — NORGESTIMATE AND ETHINYL ESTRADIOL 7DAYSX3 28
1 KIT ORAL DAILY
Qty: 84 TABLET | Refills: 2 | Status: SHIPPED | OUTPATIENT
Start: 2023-09-27

## 2023-09-27 NOTE — PROGRESS NOTES
Amrita Bernal is a 21year old female [facility] Patient's last menstrual period was 2023 (exact date). Patient presents with:  Physical: Well woman exam   .    OBSTETRICS HISTORY:  OB History    Para Term  AB Living   0 0 0 0 0 0   SAB IAB Ectopic Multiple Live Births   0 0 0 0 0       GYNE HISTORY:  Periods regular monthly    Sexual activity:   Yes      Partners:   Male      Birth control/ protection:   OCP, Condom        Hx Prior Abnormal Pap: No        MEDICAL HISTORY:  No past medical history on file. SURGICAL HISTORY:  No past surgical history on file. SOCIAL HISTORY:  Social History    Socioeconomic History      Marital status: Single      Spouse name: Not on file      Number of children: Not on file      Years of education: Not on file      Highest education level: Not on file    Occupational History      Not on file    Tobacco Use      Smoking status: Never      Smokeless tobacco: Never    Substance and Sexual Activity      Alcohol use: Never      Drug use: Never      Sexual activity: Yes        Partners: Male        Birth control/protection: OCP, Condom    Other Topics      Concerns:        Not on file    Social History Narrative      Not on file    Social Determinants of Health  Financial Resource Strain: Not on file  Food Insecurity: Not on file  Transportation Needs: Not on file  Physical Activity: Not on file  Stress: Not on file  Social Connections: Not on file  Housing Stability: Not on file    FAMILY HISTORY:  No family history on file. MEDICATIONS:    Current Outpatient Medications:     Norgestim-Eth Estrad Triphasic (ORTHO TRI-CYCLEN, 28,) 0.18/0.215/0.25 MG-35 MCG Oral Tab, Take 1 tablet by mouth daily. , Disp: 84 tablet, Rfl: 2    ALLERGIES:  No Known Allergies      Review of Systems:  Constitutional:  Denies fatigue, night sweats, hot flashes  Eyes:  denies blurred or double vision  Cardiovascular:  denies chest pain or palpitations  Respiratory:  denies shortness of breath  Gastrointestinal:  denies heartburn, abdominal pain, diarrhea or constipation  Genitourinary:  denies dysuria, incontinence, abnormal vaginal discharge, vaginal itching  Musculoskeletal:  denies back pain. Skin/Breast:  Denies any breast pain, lumps, or discharge. Neurological:  denies headaches, extremity weakness or numbness. Psychiatric: denies depression or anxiety. Endocrine:   denies excessive thirst or urination. Heme/Lymph:  denies history of anemia, easy bruising or bleeding. PHYSICAL EXAM:   Constitutional: well developed, well nourished  Head/Face: normocephalic  Neck/Thyroid: thyroid symmetric, no thyromegaly, no nodules, no adenopathy  Lymphatic:no abnormal supraclavicular or axillary adenopathy is noted  Breast: normal without palpable masses, tenderness, asymmetry, nipple discharge, nipple retraction or skin changes  Abdomen:  soft, nontender, nondistended, no masses  Skin/Hair: no unusual rashes or bruises  Extremities: no edema, no cyanosis  Psychiatric:  Oriented to time, place, person and situation. Appropriate mood and affect    Pelvic Exam:  External Genitalia: normal appearance, hair distribution, and no lesions  Urethral Meatus:  normal in size, location, without lesions and prolapse  Bladder:  No fullness, masses or tenderness  Vagina:  Normal appearance without lesions, no abnormal discharge  Cervix:  Normal without tenderness on motion  Uterus: normal in size, contour, position, mobility, without tenderness  Adnexa: normal without masses or tenderness  Perineum: normal  Anus: no hemorroids     Assessment & Plan:  Diagnoses and all orders for this visit:    Encounter for well woman exam with routine gynecological exam    Uses oral contraception  -     Norgestim-Eth Estrad Triphasic (ORTHO TRI-CYCLEN, 28,) 0.18/0.215/0.25 MG-35 MCG Oral Tab; Take 1 tablet by mouth daily.     Cervical cancer screening  -     ThinPrep PAP with HPV Reflex Request B  - Chlamydia/Gc Amplification;  Future

## 2023-09-28 LAB
C TRACH DNA SPEC QL NAA+PROBE: NEGATIVE
N GONORRHOEA DNA SPEC QL NAA+PROBE: NEGATIVE

## 2023-10-29 ENCOUNTER — PATIENT MESSAGE (OUTPATIENT)
Dept: INTERNAL MEDICINE CLINIC | Facility: CLINIC | Age: 23
End: 2023-10-29

## 2023-11-08 DIAGNOSIS — Z30.41 USES ORAL CONTRACEPTION: ICD-10-CM

## 2023-11-09 RX ORDER — NORGESTIMATE AND ETHINYL ESTRADIOL 7DAYSX3 28
1 KIT ORAL DAILY
Qty: 84 TABLET | Refills: 2 | OUTPATIENT
Start: 2023-11-09

## 2023-12-07 ENCOUNTER — PATIENT MESSAGE (OUTPATIENT)
Dept: INTERNAL MEDICINE CLINIC | Facility: CLINIC | Age: 23
End: 2023-12-07

## 2024-08-01 ENCOUNTER — APPOINTMENT (OUTPATIENT)
Dept: OCCUPATIONAL MEDICINE | Age: 24
End: 2024-08-01
Attending: NURSE PRACTITIONER

## 2024-08-27 ENCOUNTER — TELEPHONE (OUTPATIENT)
Facility: CLINIC | Age: 24
End: 2024-08-27

## 2024-08-27 NOTE — TELEPHONE ENCOUNTER
----- Message from Amairani GORDON sent at 10/4/2023  8:22 AM CDT -----  Regardin Year Repeat Pap  1 year repeat pap due 24

## 2025-03-26 ENCOUNTER — OFFICE VISIT (OUTPATIENT)
Dept: SURGERY | Facility: CLINIC | Age: 25
End: 2025-03-26
Payer: COMMERCIAL

## 2025-03-26 DIAGNOSIS — Z98.890 S/P UROLOGICAL SURGERY: ICD-10-CM

## 2025-03-26 DIAGNOSIS — Q62.5 DUPLICATED URINARY COLLECTING SYSTEM: ICD-10-CM

## 2025-03-26 DIAGNOSIS — R31.29 MICROHEMATURIA: Primary | ICD-10-CM

## 2025-03-26 PROCEDURE — 99213 OFFICE O/P EST LOW 20 MIN: CPT | Performed by: UROLOGY

## 2025-03-26 NOTE — PROGRESS NOTES
[facility]  Follow-Up Visit    HPI: Allison Miranda is a 24 year old female presents for a follow up visit. Patient was last seen on 6/1/2023.    INTERVAL HISTORY: Following up regarding duplicated collecting system and previous urological surgery as a child in [facility].    She denies any UTI symptoms, gross hematuria or dysuria.    She had a UA ordered by her new PCP every 2025.  It showed 3+ blood with >100 RBCs, 11-20 WBCs, and rare bacteria.  She reports being on her period when the urine test was done.    She had a CT IVP completed through [facility] on 2/8/2025.  This revealed duplication of the left renal collecting system.  Otherwise, unremarkable examination.      1. Asymptomatic Bacteriuria  2. Bilateral Duplicated collecting system  3. History of pediatric urological surgery  History obtained from patient and accompanying mother's recollection.     Allison was born in [facility] and moved here around age 14.  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  Looks like she had recurrent UTIs and pyelonephritis as a child.     Sounds like she underwent a right upper pole moiety partial nephrectomy with partial ureterectomy at age 1 as evidenced by a right flank scar.  At almost 2 years old she had bladder surgery to questionably perform a distal right ureterectomy versus reimplant for management of a ureterocele.     She denies any flank pain at this point.  No gross hematuria or dysuria.  No difficulty urinating or incomplete bladder emptying.  No straining to urinate.     Her primary care physician sent her urine for routine analysis on 4/21/2023.  This revealed pyuria, and bacteriuria.  Repeat urinalysis 4/28/2023 with leuks, nitrites, pyuria, microhematuria.  Urine culture was positive for E. Coli.  Patient was  completely asymptomatic.     Treated with a course of sensitive antibiotics.  Repeat urine culture was negative.     She had a renal ultrasound February 2023 which revealed right kidney smaller than the left.  Mild right hydronephrosis.  Fullness of the left collecting structures.  200 for a mild postvoid bladder volume.     CT urogram completed 5/2023 revealing irregular soft tissue at the base of the urinary bladder.  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  Partial duplication of the left collecting system with hydroureter of the mid to distal left ureter.  No evidence of obstruction.  No urinary calculi or obstructive uropathy.     This was followed by an MRI of the pelvis with and without contrast 5/30/2023 which revealed a fluid-filled tubular structure of the right hemipelvis favored to represent a duplicated ureteral segment, supported by presence of a small bladder ureterocele.  The superior margin of this process appears blind-ending which may be on a chronic postsurgical basis.  No focal wall thickening or mass of the bladder.  No adenopathy.     Patient has normal kidney function at baseline.     Bladder scan 6/2023 revealed a PVR of 3 mL.    - 3/2025 F/U: No symptoms.  CT IVP 2/2025 without any significant changes.  Duplicated left collecting system.  Had a UA February 2025 that showed microscopic hematuria.  She was on her period.  Will repeat urine microscopy.        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      PAST SURGICAL HISTORY: ? Right upper pole moiety heminephrectomy with partial ureterectomy. ? Distal ureterectomy.     SOCIAL HISTORY: Single. No children. No smoking or illicit drug use.  She is a mammographer.      Reviewed past medical, surgical, family, and social history.  Reviewed med list and  allergies.      REVIEW OF SYSTEMS:  Pertinent positives and negatives per HPI. A 10-point ROS was performed and is otherwise negative.       EXAM:  There were no vitals taken for this visit.    Physical Exam  Constitutional:       General: She is not in acute distress.     Appearance: She is well-developed.   HENT:      Head: Normocephalic.   Eyes:      General: No scleral icterus.  Cardiovascular:      Rate and Rhythm: Normal rate.   Pulmonary:      Effort: Pulmonary effort is normal.   Skin:     General: Skin is warm and dry.   Neurological:      Mental Status: She is alert and oriented to person, place, and time.   Psychiatric:         Mood and Affect: Mood normal.         Behavior: Behavior normal.       PATHOLOGY:  No results found.      LABS:  See HPI.      IMAGING:    Report of CT IVP from [facility] completed February 2025 on Care Everywhere:  The uninfused images demonstrate no urinary tract calculi. The kidneys enhance promptly and symmetrically with contrast. There is duplication of the left renal collecting system, with two separate ureters present which likely fuses to the level of L4. The kidneys are asymmetric in size as a result, the left measuring 12.9 cm in bipolar length and the   right measuring 8.8 cm. No focal renal lesions are seen. Bilaterally, the renal collecting systems, pelves and ureters are of normal caliber without area of persistent narrowing, dilatation or filling defect. The urinary bladder is partially distended and unremarkable in morphology.       UROLOGY PROCEDURE:  Not performed today.      IMPRESSION:  24 year old female with a past pediatric urologic history most likely consistent with bilateral duplicated collecting systems and a nonfunctioning obstructed right upper pole moiety which was managed surgically in [facility] as a child with right upper pole heminephrectomy.     She also seems to have underwent surgery for management of a right-sided ureterocele.   Patient with what looks like a defunctionalized segment of the right ureter distally.  She has no clear evidence of urinary obstruction.     Pelvic MRI without concerns for bladder masses.    Patient denies any significant urological symptoms.    Recent CT IVP without any new findings compared to prior.  No hydronephrosis.  No masses.    Recent urinalysis revealing microscopic hematuria.  Patient reports having her period when the UA was checked.  Recommend repeating urine microscopy at this point.  If negative, no further evaluation needed.      If microscopic hematuria noted, would recommend cystoscopy for further evaluation.    Patient agreeable.  All questions answered.      PLAN:  1.  Send urine for microscopy.  If microscopic hematuria noted, recommend cystoscopy for further evaluation.  If negative, no further evaluation needed.      Pawan Sewell MD  3/26/2025

## 2025-03-30 ENCOUNTER — PATIENT MESSAGE (OUTPATIENT)
Dept: SURGERY | Facility: CLINIC | Age: 25
End: 2025-03-30

## 2025-03-31 NOTE — TELEPHONE ENCOUNTER
PLAN:  1.  Send urine for microscopy.  If microscopic hematuria noted, recommend cystoscopy for further evaluation.  If negative, no further evaluation needed.        Pawan Sewell MD  3/26/2025    Result   UA Microscopic only, urine (Order [id number])   Contains abnormal data UA Microscopic only, urine  Order: [id number]   Collected 3/26/2025 11:58 AM       Status: Final result       Dx: Microhematuria    0 Result Notes      Component  Ref Range & Units 3/26/25 11:58 AM   WBC Urine  0 - 5 /HPF 11-20 Abnormal    RBC Urine  0 - 2 /HPF 3-5 Abnormal    Bacteria Urine  None Seen /HPF Rare Abnormal    Squamous Epi. Cells  None Seen /HPF Few Abnormal    Renal Tubular Epithelial Cells  None Seen /HPF None Seen   Transitional Cells  None Seen /HPF None Seen   Yeast Urine  None Seen /HPF None Seen   Resulting Agency [facility] ([facility])             Specimen Collected: 03/26/25 11:58 AM Last Resulted: 03/26/25 10:20 PM

## 2025-04-08 NOTE — TELEPHONE ENCOUNTER
Urine still shows 3-5 RBC, cystoscopy recommended per Dr Sewell's note. Please reach out to the patient and assist with scheduling this.

## 2025-04-10 ENCOUNTER — TELEPHONE (OUTPATIENT)
Dept: SURGERY | Facility: CLINIC | Age: 25
End: 2025-04-10

## 2025-04-11 NOTE — TELEPHONE ENCOUNTER
I called patient verified name and , patient was scheduled for a cystoscopy on 2025 to arrive at 12:30 pm for a 1 pm appointment. She was given pre and post procedure instructions and verbalized agreement. Call then ended.

## 2025-04-14 NOTE — TELEPHONE ENCOUNTER
- see procedure TE, pt scheduled for cysto on 4/10/25  Future Appointments   Date Time Provider Department Center   4/16/2025  1:00 PM [facility]

## 2025-04-16 ENCOUNTER — PROCEDURE (OUTPATIENT)
Dept: SURGERY | Facility: CLINIC | Age: 25
End: 2025-04-16
Payer: COMMERCIAL

## 2025-04-16 DIAGNOSIS — Z98.890 S/P UROLOGICAL SURGERY: ICD-10-CM

## 2025-04-16 DIAGNOSIS — R31.29 MICROHEMATURIA: Primary | ICD-10-CM

## 2025-04-16 DIAGNOSIS — L91.8 FIBROEPITHELIAL POLYP: ICD-10-CM

## 2025-04-16 PROCEDURE — 99214 OFFICE O/P EST MOD 30 MIN: CPT | Performed by: UROLOGY

## 2025-04-16 PROCEDURE — 52000 CYSTOURETHROSCOPY: CPT | Performed by: UROLOGY

## 2025-04-16 RX ORDER — CIPROFLOXACIN 500 MG/1
500 TABLET, FILM COATED ORAL ONCE
Status: SHIPPED | OUTPATIENT
Start: 2025-04-16

## 2025-04-16 NOTE — PROGRESS NOTES
[facility]  Follow-Up Visit    HPI: Allison Miranda is a 24 year old female presents for a follow up visit. Patient was last seen on 3/26/2025.    INTERVAL HISTORY: Following up regarding duplicated collecting system and previous urological surgery as a child in [facility].    She denies any UTI symptoms, gross hematuria or dysuria.    She had a UA ordered by her new PCP 2/2025.  It showed 3+ blood with >100 RBCs, 11-20 WBCs, and rare bacteria.  She reports being on her period when the urine test was done.    Repeat urinalysis 3/26/2025 revealed persistent microhematuria with 3-5 RBCs per hpf.    She had a CT IVP completed through [facility] on 2/8/2025.  This revealed duplication of the left renal collecting system.  Otherwise, unremarkable examination.    Here for cystoscopy.      1. Asymptomatic Bacteriuria  2. Bilateral Duplicated collecting system  3. History of pediatric urological surgery  History obtained from patient and accompanying mother's recollection.     Allison was born in [facility] and moved here around age 14.  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  Looks like she had recurrent UTIs and pyelonephritis as a child.     Sounds like she underwent a right upper pole moiety partial nephrectomy with partial ureterectomy at age 1 as evidenced by a right flank scar.  At almost 2 years old she had bladder surgery to questionably perform a distal right ureterectomy versus reimplant for management of a ureterocele.     She denies any flank pain at this point.  No gross hematuria or dysuria.  No difficulty urinating or incomplete bladder emptying.  No straining to urinate.     Her primary care physician sent her urine for routine analysis on 4/21/2023.  This revealed pyuria, and bacteriuria.  Repeat urinalysis  4/28/2023 with leuks, nitrites, pyuria, microhematuria.  Urine culture was positive for E. Coli.  Patient was completely asymptomatic.     Treated with a course of sensitive antibiotics.  Repeat urine culture was negative.     She had a renal ultrasound February 2023 which revealed right kidney smaller than the left.  Mild right hydronephrosis.  Fullness of the left collecting structures.  200 for a mild postvoid bladder volume.     CT urogram completed 5/2023 revealing irregular soft tissue at the base of the urinary bladder.  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  Partial duplication of the left collecting system with hydroureter of the mid to distal left ureter.  No evidence of obstruction.  No urinary calculi or obstructive uropathy.     This was followed by an MRI of the pelvis with and without contrast 5/30/2023 which revealed a fluid-filled tubular structure of the right hemipelvis favored to represent a duplicated ureteral segment, supported by presence of a small bladder ureterocele.  The superior margin of this process appears blind-ending which may be on a chronic postsurgical basis.  No focal wall thickening or mass of the bladder.  No adenopathy.     Patient has normal kidney function at baseline.     Bladder scan 6/2023 revealed a PVR of 3 mL.    - 3/2025 F/U: No symptoms.  CT IVP 2/2025 without any significant changes.  Duplicated left collecting system.  Had a UA February 2025 that showed microscopic hematuria.  She was on her period.  Repeat urine microscopy showing 3-5 RBCs per hpf.    - 4/16/25 F/U: Cystoscopy revealing single UO on each side.  Sizable fibroepithelial polyp at the bladder neck 5 o'clock position.  Recommend TURBT.      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      PAST SURGICAL HISTORY: ?  Right upper pole moiety heminephrectomy with partial ureterectomy. ? Distal ureterectomy.     SOCIAL HISTORY: Single. No children. No smoking or illicit drug use.  She is a mammographer.      Reviewed past medical, surgical, family, and social history.  Reviewed med list and allergies.      REVIEW OF SYSTEMS:  Pertinent positives and negatives per HPI. A 10-point ROS was performed and is otherwise negative.       EXAM:  There were no vitals taken for this visit.    Physical Exam  Constitutional:       General: She is not in acute distress.     Appearance: She is well-developed.   HENT:      Head: Normocephalic.   Eyes:      General: No scleral icterus.  Cardiovascular:      Rate and Rhythm: Normal rate.   Pulmonary:      Effort: Pulmonary effort is normal.   Skin:     General: Skin is warm and dry.   Neurological:      Mental Status: She is alert and oriented to person, place, and time.   Psychiatric:         Mood and Affect: Mood normal.         Behavior: Behavior normal.       PATHOLOGY:  No results found.      LABS:  See HPI.      IMAGING:    Report of CT IVP from [facility] completed February 2025 on Care Everywhere:  The uninfused images demonstrate no urinary tract calculi. The kidneys enhance promptly and symmetrically with contrast. There is duplication of the left renal collecting system, with two separate ureters present which likely fuses to the level of L4. The kidneys are asymmetric in size as a result, the left measuring 12.9 cm in bipolar length and the   right measuring 8.8 cm. No focal renal lesions are seen. Bilaterally, the renal collecting systems, pelves and ureters are of normal caliber without area of persistent narrowing, dilatation or filling defect. The urinary bladder is partially distended and unremarkable in morphology.       UROLOGY PROCEDURE:    CYSTOURETHROSCOPY  Informed consent was obtained for the procedure.  A female chaperone was present.  Patient was prepped  and draped in the usual sterile fashion.  An Ambu 16 Polish flexible cystoscope was utilized for the procedure.    Anesthesia:  2% lidocaine gel.    Urethra: Normal.    Bladder: Abnormal there is a fibroepithelial polyp measuring about 5 to 10 mm at the bladder neck posteriorly at the 5 o'clock position. .  No tumor, stone, diverticulum, or glomerulation.    U.O's: Single ureteral orifice on each side, closer to the bladder neck.    Trabeculation: 1+.    POST CYSTOSCOPY MEDICATIONS: sample one tablet Cipro 500 mg given to patient.    DIAGNOSIS: Fibroepithelial bladder neck polyp.        IMPRESSION:  24 year old female with a past pediatric urologic history most likely consistent with bilateral duplicated collecting systems and a nonfunctioning obstructed right upper pole moiety which was managed surgically in [facility] as a child with right upper pole heminephrectomy.     She also seems to have underwent surgery for management of a right-sided ureterocele.  Patient with what looks like a defunctionalized segment of the right ureter distally.  She has no clear evidence of urinary obstruction.     Pelvic MRI without concerns for bladder masses.    Patient denies any significant urological symptoms.    Recent CT IVP without any new findings compared to prior.  No hydronephrosis.  No masses.    Recent urinalysis revealing microscopic hematuria.      Cystoscopy today revealing a fibroepithelial polyp in the bladder.  Reviewed with patient.  Discussed likely benign pathology.  Recommend that she consider transurethral resection for removal and to obtain tissue for diagnosis.    Procedure reviewed including rationale, approach, benefits, risks, and alternatives.  Discussed risks of medical anesthetic complications, bleeding, infection, damage surrounding organs or structures, possible need for additional procedures.    Patient agreeable.  All questions answered.      PLAN:  1.  Patient to consider cystoscopy and  transurethral resection of bladder tumor/fibroepithelial polyp.  She will call us to schedule.      Pawan Sewell MD  4/16/2025

## 2025-04-17 ENCOUNTER — TELEPHONE (OUTPATIENT)
Dept: SURGERY | Facility: CLINIC | Age: 25
End: 2025-04-17

## 2025-04-17 DIAGNOSIS — N32.9 LESION OF URINARY BLADDER: ICD-10-CM

## 2025-04-17 DIAGNOSIS — R82.90 ABNORMAL URINE FINDINGS: ICD-10-CM

## 2025-04-17 DIAGNOSIS — L91.8 FIBROEPITHELIAL POLYP: Primary | ICD-10-CM

## 2025-04-17 DIAGNOSIS — Z79.01 MONITORING FOR ANTICOAGULANT USE: ICD-10-CM

## 2025-04-17 DIAGNOSIS — Z51.81 MONITORING FOR ANTICOAGULANT USE: ICD-10-CM

## 2025-04-17 NOTE — TELEPHONE ENCOUNTER
Urology Surgery Scheduling Request    Location: [facility] OR    Surgeon: WENDY Sewell MD    Asst. Surgeon: N/A    Diagnosis: Lesion of urinary bladder.    Procedure: Cystoscopy, transurethral resection of bladder tumor.    Procedure CPT Code (if known): 65386    Anesthesia: General     Time Frame: elective 5/2025.    Time needed: 45 minutes    Special Equipment: Bipolar Resectoscope    On Call to OR: Ancef (cefazolin) 2 g IV.    Admission: Day Surgery    Pre-op Testing: PT/INR, PTT, and Urine Culture     Need Pre-op Clearance: N/A    Estimated Post Op/Follow Up Appt: 2 weeks.    Pawan Sewell MD  4/17/2025

## 2025-04-18 NOTE — TELEPHONE ENCOUNTER
Spoke with pt, scheduled for surgery 5/23/25 with Dr Sewell.  2wk follow up scheduled for 6/5/25 at 8:20a with Dr Sewell.     Pt aware to complete preoperative labs 7-10 days prior to surgery.     Surgery information sent to pt thru portal.

## 2025-05-15 ENCOUNTER — APPOINTMENT (OUTPATIENT)
Dept: LAB | Age: 25
End: 2025-05-15
Payer: COMMERCIAL

## 2025-05-15 ENCOUNTER — LAB ENCOUNTER (OUTPATIENT)
Dept: LAB | Facility: HOSPITAL | Age: 25
End: 2025-05-15
Attending: UROLOGY
Payer: COMMERCIAL

## 2025-05-15 DIAGNOSIS — Z51.81 MONITORING FOR ANTICOAGULANT USE: ICD-10-CM

## 2025-05-15 DIAGNOSIS — R82.90 ABNORMAL URINE FINDINGS: ICD-10-CM

## 2025-05-15 DIAGNOSIS — Z79.01 MONITORING FOR ANTICOAGULANT USE: ICD-10-CM

## 2025-05-15 LAB
APTT PPP: 29.2 SECONDS (ref 23–36)
INR BLD: 0.93 (ref 0.8–1.2)
PROTHROMBIN TIME: 13.1 SECONDS (ref 11.6–14.8)

## 2025-05-15 PROCEDURE — 85610 PROTHROMBIN TIME: CPT

## 2025-05-15 PROCEDURE — 87086 URINE CULTURE/COLONY COUNT: CPT

## 2025-05-15 PROCEDURE — 36415 COLL VENOUS BLD VENIPUNCTURE: CPT

## 2025-05-15 PROCEDURE — 85730 THROMBOPLASTIN TIME PARTIAL: CPT

## 2025-05-23 ENCOUNTER — ANESTHESIA (OUTPATIENT)
Dept: SURGERY | Facility: HOSPITAL | Age: 25
End: 2025-05-23
Payer: COMMERCIAL

## 2025-05-23 ENCOUNTER — HOSPITAL ENCOUNTER (OUTPATIENT)
Facility: HOSPITAL | Age: 25
Setting detail: HOSPITAL OUTPATIENT SURGERY
Discharge: HOME | End: 2025-05-23
Attending: UROLOGY | Admitting: UROLOGY
Payer: COMMERCIAL

## 2025-05-23 ENCOUNTER — ANESTHESIA EVENT (OUTPATIENT)
Dept: SURGERY | Facility: HOSPITAL | Age: 25
End: 2025-05-23
Payer: COMMERCIAL

## 2025-05-23 VITALS
OXYGEN SATURATION: 100 % | TEMPERATURE: 98 F | WEIGHT: 158 LBS | SYSTOLIC BLOOD PRESSURE: 111 MMHG | HEART RATE: 75 BPM | RESPIRATION RATE: 16 BRPM | BODY MASS INDEX: 26.33 KG/M2 | DIASTOLIC BLOOD PRESSURE: 69 MMHG | HEIGHT: 65 IN

## 2025-05-23 DIAGNOSIS — L91.8 FIBROEPITHELIAL POLYP: ICD-10-CM

## 2025-05-23 DIAGNOSIS — N32.9 LESION OF URINARY BLADDER: ICD-10-CM

## 2025-05-23 LAB — B-HCG UR QL: NEGATIVE

## 2025-05-23 PROCEDURE — 52234 CYSTOSCOPY AND TREATMENT: CPT | Performed by: UROLOGY

## 2025-05-23 RX ORDER — SODIUM CHLORIDE, SODIUM LACTATE, POTASSIUM CHLORIDE, CALCIUM CHLORIDE 600; 310; 30; 20 MG/100ML; MG/100ML; MG/100ML; MG/100ML
INJECTION, SOLUTION INTRAVENOUS CONTINUOUS
Status: DISCONTINUED | OUTPATIENT
Start: 2025-05-23 | End: 2025-05-23

## 2025-05-23 RX ORDER — HYDROMORPHONE HYDROCHLORIDE 1 MG/ML
0.6 INJECTION, SOLUTION INTRAMUSCULAR; INTRAVENOUS; SUBCUTANEOUS EVERY 5 MIN PRN
Refills: 0 | Status: DISCONTINUED | OUTPATIENT
Start: 2025-05-23 | End: 2025-05-23

## 2025-05-23 RX ORDER — ONDANSETRON 2 MG/ML
INJECTION INTRAMUSCULAR; INTRAVENOUS AS NEEDED
Status: DISCONTINUED | OUTPATIENT
Start: 2025-05-23 | End: 2025-05-23

## 2025-05-23 RX ORDER — ACETAMINOPHEN 500 MG
1000 TABLET ORAL ONCE
Status: COMPLETED | OUTPATIENT
Start: 2025-05-23 | End: 2025-05-23

## 2025-05-23 RX ORDER — DEXAMETHASONE SODIUM PHOSPHATE 4 MG/ML
VIAL (ML) INJECTION AS NEEDED
Status: DISCONTINUED | OUTPATIENT
Start: 2025-05-23 | End: 2025-05-23 | Stop reason: SURG

## 2025-05-23 RX ORDER — KETOROLAC TROMETHAMINE 30 MG/ML
INJECTION, SOLUTION INTRAMUSCULAR; INTRAVENOUS AS NEEDED
Status: DISCONTINUED | OUTPATIENT
Start: 2025-05-23 | End: 2025-05-23 | Stop reason: SURG

## 2025-05-23 RX ORDER — HYDROMORPHONE HYDROCHLORIDE 1 MG/ML
0.2 INJECTION, SOLUTION INTRAMUSCULAR; INTRAVENOUS; SUBCUTANEOUS EVERY 5 MIN PRN
Refills: 0 | Status: DISCONTINUED | OUTPATIENT
Start: 2025-05-23 | End: 2025-05-23

## 2025-05-23 RX ORDER — HYDROMORPHONE HYDROCHLORIDE 1 MG/ML
0.4 INJECTION, SOLUTION INTRAMUSCULAR; INTRAVENOUS; SUBCUTANEOUS EVERY 5 MIN PRN
Refills: 0 | Status: DISCONTINUED | OUTPATIENT
Start: 2025-05-23 | End: 2025-05-23

## 2025-05-23 RX ORDER — PHENAZOPYRIDINE HYDROCHLORIDE 100 MG/1
100 TABLET, FILM COATED ORAL 3 TIMES DAILY PRN
Qty: 9 TABLET | Refills: 0 | Status: SHIPPED | OUTPATIENT
Start: 2025-05-23

## 2025-05-23 RX ORDER — MORPHINE SULFATE 4 MG/ML
4 INJECTION, SOLUTION INTRAMUSCULAR; INTRAVENOUS EVERY 10 MIN PRN
Status: DISCONTINUED | OUTPATIENT
Start: 2025-05-23 | End: 2025-05-23

## 2025-05-23 RX ORDER — NALOXONE HYDROCHLORIDE 0.4 MG/ML
0.08 INJECTION, SOLUTION INTRAMUSCULAR; INTRAVENOUS; SUBCUTANEOUS AS NEEDED
Status: DISCONTINUED | OUTPATIENT
Start: 2025-05-23 | End: 2025-05-23

## 2025-05-23 RX ORDER — MORPHINE SULFATE 10 MG/ML
6 INJECTION, SOLUTION INTRAMUSCULAR; INTRAVENOUS EVERY 10 MIN PRN
Refills: 0 | Status: DISCONTINUED | OUTPATIENT
Start: 2025-05-23 | End: 2025-05-23

## 2025-05-23 RX ORDER — PHENAZOPYRIDINE HYDROCHLORIDE 200 MG/1
200 TABLET, FILM COATED ORAL ONCE AS NEEDED
Status: DISCONTINUED | OUTPATIENT
Start: 2025-05-23 | End: 2025-05-23

## 2025-05-23 RX ORDER — ONDANSETRON 2 MG/ML
INJECTION INTRAMUSCULAR; INTRAVENOUS AS NEEDED
Status: DISCONTINUED | OUTPATIENT
Start: 2025-05-23 | End: 2025-05-23 | Stop reason: SURG

## 2025-05-23 RX ORDER — MORPHINE SULFATE 4 MG/ML
2 INJECTION, SOLUTION INTRAMUSCULAR; INTRAVENOUS EVERY 10 MIN PRN
Status: DISCONTINUED | OUTPATIENT
Start: 2025-05-23 | End: 2025-05-23

## 2025-05-23 RX ORDER — MAGNESIUM HYDROXIDE 1200 MG/15ML
LIQUID ORAL CONTINUOUS PRN
Status: DISCONTINUED | OUTPATIENT
Start: 2025-05-23 | End: 2025-05-23

## 2025-05-23 RX ORDER — MIDAZOLAM HYDROCHLORIDE 1 MG/ML
INJECTION INTRAMUSCULAR; INTRAVENOUS AS NEEDED
Status: DISCONTINUED | OUTPATIENT
Start: 2025-05-23 | End: 2025-05-23 | Stop reason: SURG

## 2025-05-23 RX ORDER — LIDOCAINE HYDROCHLORIDE 10 MG/ML
INJECTION, SOLUTION EPIDURAL; INFILTRATION; INTRACAUDAL; PERINEURAL AS NEEDED
Status: DISCONTINUED | OUTPATIENT
Start: 2025-05-23 | End: 2025-05-23 | Stop reason: SURG

## 2025-05-23 RX ORDER — LIDOCAINE HYDROCHLORIDE 20 MG/ML
JELLY TOPICAL AS NEEDED
Status: DISCONTINUED | OUTPATIENT
Start: 2025-05-23 | End: 2025-05-23 | Stop reason: HOSPADM

## 2025-05-23 RX ORDER — SULFAMETHOXAZOLE AND TRIMETHOPRIM 800; 160 MG/1; MG/1
1 TABLET ORAL 2 TIMES DAILY
Qty: 6 TABLET | Refills: 0 | Status: SHIPPED | OUTPATIENT
Start: 2025-05-23 | End: 2025-05-26

## 2025-05-23 RX ADMIN — DEXAMETHASONE SODIUM PHOSPHATE 4 MG: 4 MG/ML VIAL (ML) INJECTION at 14:23:00

## 2025-05-23 RX ADMIN — KETOROLAC TROMETHAMINE 30 MG: 30 INJECTION, SOLUTION INTRAMUSCULAR; INTRAVENOUS at 14:46:00

## 2025-05-23 RX ADMIN — SODIUM CHLORIDE, SODIUM LACTATE, POTASSIUM CHLORIDE, CALCIUM CHLORIDE: 600; 310; 30; 20 INJECTION, SOLUTION INTRAVENOUS at 14:14:00

## 2025-05-23 RX ADMIN — ONDANSETRON 4 MG: 2 INJECTION INTRAMUSCULAR; INTRAVENOUS at 14:23:00

## 2025-05-23 RX ADMIN — MIDAZOLAM HYDROCHLORIDE 2 MG: 1 INJECTION INTRAMUSCULAR; INTRAVENOUS at 14:14:00

## 2025-05-23 RX ADMIN — LIDOCAINE HYDROCHLORIDE 50 MG: 10 INJECTION, SOLUTION EPIDURAL; INFILTRATION; INTRACAUDAL; PERINEURAL at 14:16:00

## 2025-05-23 NOTE — H&P
History & Physical Examination    Patient Name: Allison Miranda  MRN: [id number]  [facility]: [id number]  YOB: 2000    Diagnosis: Bladder Lesion    Present Illness: Patient is a 24-year-old female who was being evaluated for microscopic hematuria.  Office cystoscopy 4/16/2025 revealed a sizable fibroepithelial polyp at the bladder neck 5 o'clock position.  Management options were discussed and patient elected to proceed with cystoscopy and transurethral resection of the bladder lesion.  Procedure discussed including rationale, approach, benefits, risks, possible complications, and reasonable alternatives of treatment.  We discussed surgical risks including not limited to medical and anesthetic complications, bleeding, infection, damage surrounding organs or structures, possible need for additional procedures.  Expected postoperative course of recovery was reviewed.  Patient verbalized understanding and wishes to proceed.    Allergies: Allergies[1]    Past Medical History[2]  Past Surgical History[3]  Family History[4]  Social History     Tobacco Use    Smoking status: Never    Smokeless tobacco: Never   Substance Use Topics    Alcohol use: Never       SYSTEM Check if Review is Normal Check if Physical Exam is Normal If not normal, please explain:   HEENT [X] [X]    NECK & BACK [X] [X]    HEART [X] [X]    LUNGS [X] [X]    ABDOMEN [X] [X]    UROGENITAL [X] [X]    EXTREMITIES [X] [X]    OTHER        [ x ] I have discussed the risks and benefits and alternatives with the patient/family.  They understand and agree to proceed with plan of care.  [ x ] I have reviewed the History and Physical done within the last 30 days.  Any changes noted above.    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 No pertinent family history.

## 2025-05-23 NOTE — OPERATIVE REPORT
[facility]  part of [facility]  Urology Operative Note         Allison Chaudharykifermin Location: OR   CSN [id number] MRN [id number]   Admission Date 5/23/2025 Operation Date 5/23/2025   Attending Physician Pawan Sewell MD       Patient Name: Allison Miranda     Preoperative Diagnosis:   1. 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 Lesion of urinary bladder [N32.9]     Postoperative Diagnosis:   1. 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 Lesion of urinary bladder [N32.9]     Procedure(s): Cystoscopy transuretheral resection of bladder tumor     Primary Surgeon: Pawan Sewell MD     Anesthesia: General LMA    Specimen:   ID Type Source Tests Collected by Time Destination   1 : 1. Bladder neck fibroepithelial polyp, resection Tissue Bladder neck SURGICAL PATHOLOGY TISSUE Pawan Sewell MD 5/23/2025  2:43 PM         Estimated Blood Loss: None  Complications: None.     Indications for procedure: Patient is a 24-year-old female who was being evaluated for microscopic hematuria. Office cystoscopy 4/16/2025 revealed a sizable fibroepithelial polyp at the bladder neck 5 o'clock position. Management options were discussed and patient elected to proceed with cystoscopy and transurethral resection of the bladder lesion. Procedure discussed including rationale, approach, benefits, risks, possible complications, and reasonable alternatives of treatment. We discussed surgical risks including not limited to medical and anesthetic complications, bleeding, infection, damage surrounding organs or structures, possible need for additional procedures. Expected postoperative course of recovery was reviewed. Patient verbalized understanding and wishes to proceed.      Surgical Findings: Fibroepithelial bladder neck polyp at the 5 o'clock position, completely resected using bipolar resectoscope.  Hemostasis satisfactory.  Orthotopic UOs bilaterally.  Ectopic right ureteral orifice at the bladder neck noted after  the fibroepithelial polyp was resected.  No other bladder tumors.    Operative Summary:  The patient was brought to the operating room and identified by their wristband including their name, medical record number, and date of birth. They were transferred to the operating room table. Appropriate monitoring devices were connected to the patient. SCD's were applied to the bilateral lower extremities for DVT prophylaxis. Successful induction of general level LMA anesthesia was achieved. IV antibiotics were administered for surgical prophylaxis. The patient was then positioned in dorsal lithotomy with their legs in Juan stirrups and all pressure areas and bony prominences padded appropriately. The surgical site was prepped and draped in standard sterile fashion. A full surgical timeout was performed with agreement upon all of its components.    The urethral meatus was calibrated to 28 Ivorian using sequential Seble sounds.  A 26 Ivorian resectoscope with a 30 degree lens and a visual obturator was inserted to the bladder.  Pan cystoscopy performed.  I was able to visualize the bilateral ureteral orifices in their orthotopic location.  A 1 cm fibroepithelial polyp was noted at the bladder neck, protruding into the bladder lumen.  No other bladder tumors, stones, or lesions identified.  The visual obturator was exchanged for a resectoscope working element with a bipolar cutting loop electrode.  The fibroepithelial polyp was resected completely.  Point electrocautery was used to achieve hemostasis which was satisfactory.  The specimen was retrieved and sent for permanent pathologic section.    At this point, and after the fibroepithelial polyp had been resected, I was able to identify an ectopic right ureteral orifice at the level of the bladder neck on the right side.  It was certainly proximal to the external sphincter.    The bladder was emptied and the resectoscope was removed.  10 mL of 2% lidocaine jelly were  instilled per urethra for postoperative analgesia.    This concluded our procedure.  Patient was repositioned supine, general anesthesia was reversed and she was successfully extubated and transported to the recovery room in a stable condition.  She tolerated the procedure well without any immediate complication.     Implants: * No implants in log *     Drains: None    Condition: Extubated and stable to PACU.    I was present, scrubbed, and performed the procedure in its entirety.  At the patient's request, findings were discussed with her family following the procedure.      Pawan Sewell MD

## 2025-05-23 NOTE — ANESTHESIA PREPROCEDURE EVALUATION
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]  Lesion of urinary bladder [N32.9]    Relevant Problems   No relevant active problems       NPO:  Last Liquid Consumption Date: 05/22/25  Last Liquid Consumption Time: 0730 (water)  Last Solid Consumption Date: 05/22/25  Last Solid Consumption Time: 1800  Last Liquid Consumption Date: 05/22/25          History Review:  Patient Active Problem List    Diagnosis Date Noted    Recurrent UTI 05/15/2023    Hydronephrosis with ureteral stricture, not elsewhere classified 05/15/2023    Congenital anomaly of urinary tract 05/15/2023       Past Medical History[1]    Past Surgical History[2]    Prescriptions Prior to Admission[3]  Current Medications and Prescriptions Ordered in Epic[4]    Allergies[5]    Family History[6]  Social Hx on file[7]    Available pre-op labs reviewed.        Lab Results   Component Value Date    INR 0.93 05/15/2025       Vital Signs:  Body mass index is 26.29 kg/m².   height is 1.651 m (5' 5\") and weight is 71.7 kg (158 lb). Her blood pressure is 142/89 and her pulse is 99. Her oxygen saturation is 100%.   Vitals:    05/21/25 1323 05/23/25 1157   BP:  142/89   Pulse:  99   SpO2:  100%   Weight: 68 kg (150 lb) 71.7 kg (158 lb)   Height: 1.651 m (5' 5\") 1.651 m (5' 5\")        Anesthesia Evaluation     Patient summary reviewed and Nursing notes reviewed    No history of anesthetic complications   Airway   Mallampati: I  TM distance: >3 FB  Neck ROM: full  Dental      Pulmonary - negative ROS and normal exam   Cardiovascular - negative ROS and normal exam    Neuro/Psych - negative ROS     GI/Hepatic/Renal    (+) chronic renal disease    Endo/Other    Abdominal                  Anesthesia Plan:   ASA:   1  Plan:   General  Informed Consent Plan and Risks Discussed With:  Patient      I have informed Allison Miranda and/or legal guardian or family member of the nature of the anesthetic plan, benefits, risks including possible dental damage if relevant, major complications, and any alternative forms of anesthetic management.   All of the patient's questions were answered to the best of my ability. The patient desires the anesthetic management as planned.  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   Taking   [4]   Current Facility-Administered Medications Ordered in Epic   Medication Dose Route Frequency Provider Last Rate Last Admin    lactated ringers infusion   Intravenous Continuous Pawan Sewell MD 20 mL/hr at 05/23/25 1225 New Bag at 05/23/25 1225    ceFAZolin (Ancef) 2g in 10mL IV syringe premix  2 g Intravenous Once Pawan Sewell MD         No current [facility]-ordered outpatient medications on file.   [5] No Known Allergies  [6] History reviewed. No pertinent family history.  [7]   Social History  Socioeconomic History    Marital status: Single   Tobacco Use    Smoking status: Never    Smokeless tobacco: Never   Substance and Sexual Activity    Alcohol use: Never    Drug use: Never    Sexual activity: Yes     Partners: Male     Birth control/protection: OCP, Condom

## 2025-05-23 NOTE — ANESTHESIA PROCEDURE NOTES
Airway  Date/Time: 5/23/2025 2:20 PM  Reason: Elective    Airway not difficult    General Information and Staff   Patient location during procedure: OR  Anesthesiologist: Gregg Enriquez MD  Resident/CRNA: Jessika Hoang CRNA  Performed: CRNA   Performed by: Jessika Hoang CRNA  Authorized by: Gregg Enriquez MD        Indications and Patient Condition  Indications for airway management: anesthesia  Sedation level: deep      Preoxygenated: yesPatient position: sniffing    Mask difficulty assessment: 1 - vent by mask    Final Airway Details    Final airway type: supraglottic airway      Successful airway: classic  Size: 4  Airway Seal Pressure (cm H2O): 20     Number of attempts at approach: 1  Number of other approaches attempted: 0

## 2025-05-23 NOTE — ANESTHESIA POSTPROCEDURE EVALUATION
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    EM AN Post Evaluation:   Patient Evaluated in PACU  Patient Participation: waiting for patient participation  Level of Consciousness: sleepy but conscious  Pain Score: 0  Pain Management: adequate  Airway Patency:patent  Dental exam unchanged from preop  Yes    Cardiovascular Status: stable and acceptable  Respiratory Status: acceptable and room air  Postoperative Hydration acceptable      MYRNA JACOBSON CRNA  5/23/2025 2:52 PM

## 2025-05-23 NOTE — DISCHARGE INSTRUCTIONS
- Blood in the urine, burning with urination, and the urgency to urinate are normal and expected for 3 to 5 days.    - Make sure you drink enough water to keep the urine light pink to clear.    - Take the prescribed antibiotics as instructed for 3 days.    - Take the Pyridium (also called Azo and available over-the-counter) for burning with urination. This medication will make your urine orange in color.    - We will inform you of the pathology results once they are back and reviewed.    - Call or go to the ER for intractable pain, fevers >101 F, shaking chills, nausea and vomiting, inability to urinate, very bloody urine, chest pain or shortness of breath.    We wish you a safe, speedy, and uneventful recovery.      HOME INSTRUCTIONS  [facility] HOME CARE INSTRUCTIONS: POST-OP ANESTHESIA  The medication that you received for sedation or general anesthesia can last up to 24 hours. Your judgment and reflexes may be altered, even if you feel like your normal self.      We Recommend:   Do not drive any motor vehicle or bicycle   Avoid mowing the lawn, playing sports, or working with power tools/applicances (power saws, electric knives or mixers)   That you have someone stay with you on your first night home   Do not drink alcohol or take sleeping pills or tranquilizers   Do not sign legal documents within 24 hours of your procedure   If you had a nerve block for your surgery, take extra care not to put any pressure on your arm or hand for 24 hours    It is normal:  For you to have a sore throat if you had a breathing tube during surgery (while you were asleep!). The sore throat should get better within 48 hours. You can gargle with warm salt water (1/2 tsp in 4 oz warm water) or use a throat lozenge for comfort  To feel muscle aches or soreness especially in the abdomen, chest or neck. The achy feeling should go away in the next 24 hours  To feel weak, sleepy or \"wiped out\". Your should start feeling better in the next 24  hours.   To experience mild discomforts such as sore lip or tongue, headache, cramps, gas pains or a bloated feeling in your abdomen.   To experience mild back pain or soreness for a day or two if you had spinal or epidural anesthesia.   If you had laparoscopic surgery, to feel shoulder pain or discomfort on the day of surgery.   For some patients to have nausea after surgery/anesthesia    If you feel nausea or experience vomiting:   Try to move around less.   Eat less than usual or drink only liquids until the next morning   Nausea should resolve in about 24 hours    If you have a problem when you are at home:    Call your surgeons office       Discharge Instructions: After Your Surgery  You’ve just had surgery. During surgery, you were given medicine called anesthesia to keep you relaxed and free of pain. After surgery, you may have some pain or nausea. This is common. Here are some tips for feeling better and getting well after surgery.   Going home  Your healthcare provider will show you how to take care of yourself when you go home. They'll also answer your questions. Have an adult family member or friend drive you home. For the first 24 hours after your surgery:   Don't drive or use heavy equipment.  Don't make important decisions or sign legal papers.  Take medicines as directed.  Don't drink alcohol.  Have someone stay with you, if needed. They can watch for problems and help keep you safe.  Be sure to go to all follow-up visits with your healthcare provider. And rest after your surgery for as long as your provider tells you to.   Coping with pain  If you have pain after surgery, pain medicine will help you feel better. Take it as directed, before pain becomes severe. Also, ask your healthcare provider or pharmacist about other ways to control pain. This might be with heat, ice, or relaxation. And follow any other instructions your surgeon or nurse gives you.      Stay on schedule with your medicine.      Tips for taking pain medicine  To get the best relief possible, remember these points:   Pain medicines can upset your stomach. Taking them with a little food may help.  Most pain relievers taken by mouth need at least 20 to 30 minutes to start to work.  Don't wait till your pain becomes severe before you take your medicine. Try to time your medicine so that you can take it before starting an activity. This might be before you get dressed, go for a walk, or sit down for dinner.  Constipation is a common side effect of some pain medicines. Call your healthcare provider before taking any medicines, such as laxatives or stool softeners, to help ease constipation. Also ask if you should skip any foods. Drinking lots of fluids and eating foods, such as fruits and vegetables, that are high in fiber can also help. Remember, don't take laxatives unless your surgeon has prescribed them.  Drinking alcohol and taking pain medicine can cause dizziness and slow your breathing. It can even be deadly. Don't drink alcohol while taking pain medicine.  Pain medicine can make you react more slowly to things. Don't drive or run machinery while taking pain medicine.  Your healthcare provider may tell you to take acetaminophen to help ease your pain. Ask them how much you're supposed to take each day. Acetaminophen or other pain relievers may interact with your prescription medicines or other over-the-counter (OTC) medicines. Some prescription medicines have acetaminophen and other ingredients in them. Using both prescription and OTC acetaminophen for pain can cause you to accidentally overdose. Read the labels on your OTC medicines with care. This will help you to clearly know the list of ingredients, how much to take, and any warnings. It may also help you not take too much acetaminophen. If you have questions or don't understand the information, ask your pharmacist or healthcare provider to explain it to you before you take the  OTC medicine.   Managing nausea  Some people have an upset stomach (nausea) after surgery. This is often because of anesthesia, pain, or pain medicine, less movement of food in the stomach, or the stress of surgery. These tips will help you handle nausea and eat healthy foods as you get better. If you were on a special food plan before surgery, ask your healthcare provider if you should follow it while you get better. Check with your provider on how your eating should progress. It may depend on the surgery you had. These general tips may help:   Don't push yourself to eat. Your body will tell you when to eat and how much.  Start off with clear liquids and soup. They're easier to digest.  Next try semi-solid foods as you feel ready. These include mashed potatoes, applesauce, and gelatin.  Slowly move to solid foods. Don’t eat fatty, rich, or spicy foods at first.  Don't force yourself to have 3 large meals a day. Instead eat smaller amounts more often.  Take pain medicines with a small amount of solid food, such as crackers or toast. This helps prevent nausea.  When to call your healthcare provider  Call your healthcare provider right away if you have any of these:   You still have too much pain, or the pain gets worse, after taking the medicine. The medicine may not be strong enough. Or there may be a complication from the surgery.  You feel too sleepy, dizzy, or groggy. The medicine may be too strong.  Side effects, such as nausea or vomiting. Your healthcare provider may advise taking other medicines to treat these or may change your treatment plan..  Skin changes, such as rash, itching, or hives. This may mean you have an allergic reaction. Your provider may advise taking other medicines.  The incision looks different (for instance, part of it opens up).  Bleeding or fluid leaking from the incision site, and you weren't told to expect that.  Fever of 100.4°F (38°C) or higher, or as directed by your healthcare  provider.  Call 911  Call 911 right away if you have:   Trouble breathing  Facial swelling    If you have obstructive sleep apnea   You were given anesthesia medicine during surgery to keep you comfortable and free of pain. After surgery, you may have more apnea spells because of this medicine and other medicines you were given. The spells may last longer than normal.    At home:  Keep using the continuous positive airway pressure (CPAP) device when you sleep. Unless your healthcare provider tells you not to, use it when you sleep, day or night. CPAP is a common device used to treat obstructive sleep apnea.  Talk with your provider before taking any pain medicine, muscle relaxants, or sedatives. Your provider will tell you about the possible dangers of taking these medicines.  Contact your provider if your sleeping changes a lot even when taking medicines as directed.  [employer] last reviewed this educational content on 4/1/2024  This information is for informational purposes only. This is not intended to be a substitute for professional medical advice, diagnosis, or treatment. Always seek the advice and follow the directions from your physician or other qualified health care provider.  © 8895-8296 [employer]. All rights reserved. This information is not intended as a substitute for professional medical care. Always follow your healthcare professional's instructions.

## 2025-06-05 ENCOUNTER — OFFICE VISIT (OUTPATIENT)
Dept: SURGERY | Facility: CLINIC | Age: 25
End: 2025-06-05
Payer: COMMERCIAL

## 2025-06-05 DIAGNOSIS — L91.8 FIBROEPITHELIAL POLYP: Primary | ICD-10-CM

## 2025-06-05 PROCEDURE — 99213 OFFICE O/P EST LOW 20 MIN: CPT | Performed by: UROLOGY

## 2025-06-05 NOTE — PROGRESS NOTES
[facility]  Follow-Up Visit    HPI: Allison Miranda is a 24 year old female presents for a follow up visit. Patient was last seen on 4/16/2025.  Accompanied by her mother.    INTERVAL HISTORY:     Known duplicated collecting system and previous urological surgery as a child in [facility].    She denies any UTI symptoms, gross hematuria or dysuria.    She had a UA ordered by her new PCP 2/2025.  It showed 3+ blood with >100 RBCs, 11-20 WBCs, and rare bacteria.  She reports being on her period when the urine test was done.    Repeat urinalysis 3/26/2025 revealed persistent microhematuria with 3-5 RBCs per hpf.    She had a CT IVP completed through [facility] on 2/8/2025.  This revealed duplication of the left renal collecting system.  Otherwise, unremarkable examination.    Office cystoscopy 4/16/2025 revealed a sizable fibroepithelial polyp at the bladder neck.    She underwent cystoscopy under anesthesia with TURBT on 5/23/2025.  After resecting the fibroepithelial polyp, an ectopic right ureteral orifice was identified at the bladder neck.    Pathology was benign revealing a polypoid fragment of urothelium with extensive squamous metaplasia and patchy chronic inflammation.  No evidence of dysplasia or carcinoma identified.    She is doing well.      1. Asymptomatic Bacteriuria  2. Bilateral Duplicated collecting system  3. History of pediatric urological surgery  History obtained from patient and accompanying mother's recollection.     Allison was born in [facility] and moved here around age 14.  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  Looks like she had recurrent UTIs and pyelonephritis as a child.     Sounds like she underwent a right upper pole moiety partial nephrectomy with partial ureterectomy at age 1 as evidenced  by a right flank scar.  At almost 2 years old she had bladder surgery to questionably perform a distal right ureterectomy versus reimplant for management of a ureterocele.     She denies any flank pain at this point.  No gross hematuria or dysuria.  No difficulty urinating or incomplete bladder emptying.  No straining to urinate.     Her primary care physician sent her urine for routine analysis on 4/21/2023.  This revealed pyuria, and bacteriuria.  Repeat urinalysis 4/28/2023 with leuks, nitrites, pyuria, microhematuria.  Urine culture was positive for E. Coli.  Patient was completely asymptomatic.     Treated with a course of sensitive antibiotics.  Repeat urine culture was negative.     She had a renal ultrasound February 2023 which revealed right kidney smaller than the left.  Mild right hydronephrosis.  Fullness of the left collecting structures.  200 for a mild postvoid bladder volume.     CT urogram completed 5/2023 revealing irregular soft tissue at the base of the urinary bladder.  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  Partial duplication of the left collecting system with hydroureter of the mid to distal left ureter.  No evidence of obstruction.  No urinary calculi or obstructive uropathy.     This was followed by an MRI of the pelvis with and without contrast 5/30/2023 which revealed a fluid-filled tubular structure of the right hemipelvis favored to represent a duplicated ureteral segment, supported by presence of a small bladder ureterocele.  The superior margin of this process appears blind-ending which may be on a chronic postsurgical basis.  No focal wall thickening or mass of the bladder.  No adenopathy.     Patient has normal kidney function at baseline.     Bladder scan 6/2023 revealed a PVR of 3 mL.    - 3/2025 F/U: No symptoms.  CT IVP 2/2025 without  any significant changes.  Duplicated left collecting system.  Had a UA February 2025 that showed microscopic hematuria.  She was on her period.  Repeat urine microscopy showing 3-5 RBCs per hpf.    - 4/16/25 F/U: Cystoscopy revealing single UO on each side.  Sizable fibroepithelial polyp at the bladder neck 5 o'clock position.  Recommend TURBT.    - 5/23/25: TURBT.  Pathology revealing benign fibroepithelial polyp with squamous metaplasia and inflammation.  No dysplasia or carcinoma.  Ectopic right UO identified after resection of fibroepithelial polyp.      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      PAST SURGICAL HISTORY: ? Right upper pole moiety heminephrectomy with partial ureterectomy. ? Distal ureterectomy.  TURBT 5/2025.     SOCIAL HISTORY: Single. No children. No smoking or illicit drug use.  She is a mammographer.      Reviewed past medical, surgical, family, and social history.  Reviewed med list and allergies.      REVIEW OF SYSTEMS:  Pertinent positives and negatives per HPI. A 10-point ROS was performed and is otherwise negative.       EXAM:  [facility] 05/14/2025     Physical Exam  Constitutional:       General: She is not in acute distress.     Appearance: She is well-developed.   HENT:      Head: Normocephalic.   Eyes:      General: No scleral icterus.  Cardiovascular:      Rate and Rhythm: Normal rate.   Pulmonary:      Effort: Pulmonary effort is normal.   Skin:     General: Skin is warm and dry.   Neurological:      Mental Status: She is alert and oriented to person, place, and time.   Psychiatric:         Mood and Affect: Mood normal.         Behavior: Behavior normal.       PATHOLOGY:    Provider:  Pawan Sewell MD       Collected:           05/23/2025     Bladder neck lesion/polyp, TURBT:   Polypoid fragment of urothelium with extensive squamous metaplasia and patchy chronic inflammation.    No evidence of dysplasia or carcinoma identified.         LABS:  See HPI.      IMAGING:    Report of CT IVP from [facility] completed February 2025 on Care Everywhere:  The uninfused images demonstrate no urinary tract calculi. The kidneys enhance promptly and symmetrically with contrast. There is duplication of the left renal collecting system, with two separate ureters present which likely fuses to the level of L4. The kidneys are asymmetric in size as a result, the left measuring 12.9 cm in bipolar length and the right measuring 8.8 cm. No focal renal lesions are seen. Bilaterally, the renal collecting systems, pelves and ureters are of normal caliber without area of persistent narrowing, dilatation or filling defect. The urinary bladder is partially distended and unremarkable in morphology.       UROLOGY PROCEDURE:  None performed today.      IMPRESSION:  24 year old female with a past pediatric urologic history most likely consistent with bilateral duplicated collecting systems and a nonfunctioning obstructed right upper pole moiety which was managed surgically in [facility] as a child with right upper pole heminephrectomy.     She also seems to have underwent surgery for management of a right-sided ureterocele.  Patient with what looks like a defunctionalized segment of the right ureter distally.  She has no clear evidence of urinary obstruction.     Pelvic MRI without concerns for bladder masses.    Patient denies any significant urological symptoms.    Recent CT IVP without any new findings compared to prior.  No hydronephrosis.  No masses.    Urinalysis revealing microscopic hematuria.      Cystoscopy revealing a fibroepithelial polyp in the bladder.  Reviewed with patient.  Discussed likely benign pathology.  Recommend that she consider transurethral resection for removal and to obtain tissue for diagnosis.    Status post transurethral resection of fibroepithelial polyp.  Pathology benign.  Ectopic right UO identified after resection of the  fibroepithelial polyp.  Reviewed with patient.    Pathology discussed.  No further evaluation or treatment necessary at this time.      PLAN:  1.  No further evaluation or treatment needed at this time.  Follow-up with annual urinalyses through PCP.  If persistent microscopic hematuria, consider repeat evaluation in 3 to 5 years.      Pawan Sewell MD  6/5/2025

## (undated) DEVICE — PACK CUSTOM CYSTO

## (undated) DEVICE — SOLUTION IRRIG 3000ML 0.9% NACL FLX CONT

## (undated) DEVICE — GLOVE SUR 7.5 SENSICARE PI PIP CRM PWD F

## (undated) DEVICE — PATIENT RETURN ELECTRODE, SINGLE-USE, CONTACT QUALITY MONITORING, ADULT, WITH 9FT CORD, FOR PATIENTS WEIGING OVER 33LBS. (15KG): Brand: MEGADYNE

## (undated) DEVICE — SOLUTION IRRIG 1000ML ST H2O AQUALITE PLAS

## (undated) DEVICE — DALE FOLEY CATHETER HOLDER, LEGBAND, FITS UP TO 30": Brand: DALE FOLEY CATHETER HOLDER

## (undated) DEVICE — CUTTING LOOP, BIPOLAR, 0.30MM, 24/26 FR.: Brand: N.A.

## (undated) DEVICE — SLEEVE COMPR MD KNEE LEN SGL USE KENDALL SCD

## (undated) DEVICE — UROLOGY DRAIN BAG

## (undated) DEVICE — MEDI-VAC NON-CONDUCTIVE SUCTION TUBING: Brand: CARDINAL HEALTH